# Patient Record
Sex: FEMALE | Race: OTHER | Employment: PART TIME | URBAN - METROPOLITAN AREA
[De-identification: names, ages, dates, MRNs, and addresses within clinical notes are randomized per-mention and may not be internally consistent; named-entity substitution may affect disease eponyms.]

---

## 2017-02-07 ENCOUNTER — OFFICE VISIT (OUTPATIENT)
Dept: OBGYN CLINIC | Facility: CLINIC | Age: 37
End: 2017-02-07

## 2017-02-07 VITALS
DIASTOLIC BLOOD PRESSURE: 64 MMHG | HEART RATE: 66 BPM | SYSTOLIC BLOOD PRESSURE: 99 MMHG | WEIGHT: 194 LBS | BODY MASS INDEX: 31 KG/M2

## 2017-02-07 DIAGNOSIS — N92.6 MISSED MENSES: Primary | ICD-10-CM

## 2017-02-07 LAB
CONTROL LINE PRESENT WITH A CLEAR BACKGROUND (YES/NO): YES YES/NO
KIT LOT #: NORMAL NUMERIC
PREGNANCY TEST, URINE: POSITIVE

## 2017-02-07 PROCEDURE — 99213 OFFICE O/P EST LOW 20 MIN: CPT | Performed by: ADVANCED PRACTICE MIDWIFE

## 2017-02-07 PROCEDURE — 81025 URINE PREGNANCY TEST: CPT | Performed by: ADVANCED PRACTICE MIDWIFE

## 2017-02-07 RX ORDER — DIPHENHYDRAMINE HCL/ZINC ACET 2 %-0.1 %
AEROSOL, SPRAY (GRAM) TOPICAL
COMMUNITY

## 2017-02-07 NOTE — PROGRESS NOTES
S: Here for missed menses. Previous patient. Denies pain or bleeding. Occasional queasy feeling in afternoon. Taking PNV.    O: Urine HCG +   A: Presumptive IUP @ 6 3/7  wks  AMA  P:   1) Cont PNV  2) Reviewed pregnancy recommendations regarding weight gain

## 2017-02-23 ENCOUNTER — NURSE ONLY (OUTPATIENT)
Dept: OBGYN CLINIC | Facility: CLINIC | Age: 37
End: 2017-02-23

## 2017-02-23 VITALS — HEIGHT: 67 IN

## 2017-02-23 DIAGNOSIS — O26.851 SPOTTING AFFECTING PREGNANCY IN FIRST TRIMESTER: ICD-10-CM

## 2017-02-23 DIAGNOSIS — O09.521 AMA (ADVANCED MATERNAL AGE) MULTIGRAVIDA 35+, FIRST TRIMESTER: ICD-10-CM

## 2017-02-23 PROBLEM — O09.529 AMA (ADVANCED MATERNAL AGE) MULTIGRAVIDA 35+: Status: ACTIVE | Noted: 2017-02-23

## 2017-02-23 NOTE — PROGRESS NOTES
Phone Consult. NOB Education complete and information set aside for pt to . Pt is AMA, she has a prepregnancy BMI of 31.3, Pt has 2 Cats but does not change the litter box, and she is unsure if she ever had chicken pox or the vaccine.   1 HR GTT, H

## 2017-02-24 ENCOUNTER — HOSPITAL ENCOUNTER (OUTPATIENT)
Dept: ULTRASOUND IMAGING | Age: 37
Discharge: HOME | End: 2017-02-24
Attending: ADVANCED PRACTICE MIDWIFE
Payer: COMMERCIAL

## 2017-02-24 DIAGNOSIS — O26.851 SPOTTING AFFECTING PREGNANCY IN FIRST TRIMESTER: ICD-10-CM

## 2017-02-24 DIAGNOSIS — O09.521 AMA (ADVANCED MATERNAL AGE) MULTIGRAVIDA 35+, FIRST TRIMESTER: ICD-10-CM

## 2017-02-24 PROCEDURE — 76817 TRANSVAGINAL US OBSTETRIC: CPT

## 2017-02-24 PROCEDURE — 76801 OB US < 14 WKS SINGLE FETUS: CPT

## 2017-02-25 ENCOUNTER — TELEPHONE (OUTPATIENT)
Dept: OBGYN CLINIC | Facility: CLINIC | Age: 37
End: 2017-02-25

## 2017-02-25 DIAGNOSIS — O09.521 AMA (ADVANCED MATERNAL AGE) MULTIGRAVIDA 35+, FIRST TRIMESTER: Primary | ICD-10-CM

## 2017-02-25 DIAGNOSIS — O30.091 TWIN GESTATION, UNABLE TO DETERMINE NUMBER OF PLACENTA AND NUMBER OF AMNIOTIC SACS IN FIRST TRIMESTER: ICD-10-CM

## 2017-02-25 NOTE — TELEPHONE ENCOUNTER
----- Message from SHANNAN Manzo sent at 2/25/2017 12:35 PM CST -----  Please bring this to [facility] EMERGENCY MEDICAL [facility] attention! Someone should call patient ASAP to review next steps (prob MFM consult and ultrasound).

## 2017-02-27 ENCOUNTER — TELEPHONE (OUTPATIENT)
Dept: OBGYN CLINIC | Facility: CLINIC | Age: 37
End: 2017-02-27

## 2017-02-27 PROBLEM — O30.001 TWIN GESTATION IN FIRST TRIMESTER: Status: ACTIVE | Noted: 2017-02-27

## 2017-03-03 ENCOUNTER — INITIAL PRENATAL (OUTPATIENT)
Dept: OBGYN CLINIC | Facility: CLINIC | Age: 37
End: 2017-03-03

## 2017-03-03 VITALS
HEART RATE: 71 BPM | WEIGHT: 193 LBS | BODY MASS INDEX: 30 KG/M2 | SYSTOLIC BLOOD PRESSURE: 128 MMHG | DIASTOLIC BLOOD PRESSURE: 77 MMHG

## 2017-03-03 DIAGNOSIS — IMO0001 TWINS: ICD-10-CM

## 2017-03-03 DIAGNOSIS — N91.2 AMENORRHEA: Primary | ICD-10-CM

## 2017-03-03 DIAGNOSIS — O09.529 AMA (ADVANCED MATERNAL AGE) MULTIGRAVIDA 35+, UNSPECIFIED TRIMESTER: ICD-10-CM

## 2017-03-03 LAB
APPEARANCE: CLEAR
MULTISTIX LOT#: NORMAL NUMERIC
PH, URINE: 5 (ref 4.5–8)
SPECIFIC GRAVITY: 1.01 (ref 1–1.03)
URINE-COLOR: YELLOW

## 2017-03-03 PROCEDURE — 99244 OFF/OP CNSLTJ NEW/EST MOD 40: CPT | Performed by: OBSTETRICS & GYNECOLOGY

## 2017-03-03 PROCEDURE — 81002 URINALYSIS NONAUTO W/O SCOPE: CPT | Performed by: OBSTETRICS & GYNECOLOGY

## 2017-03-05 NOTE — PROGRESS NOTES
HPI:   Oxana Minor is a 43year old female who presents for a first visit/consult visit for twin pregnancy, was referred by midwife group. Pt in exam room/seen and counseled for approx 25 min.   Pt counseled extesnively on twin pregnancy/care of twin Smokeless Status: Never Used                        Alcohol Use: Yes           4.2 oz/week       7 Glasses of wine per week           REVIEW OF SYSTEMS:   GENERAL: feels well otherwise  SKIN: denies any unusual skin lesions  EYES:denies blurred vision or d

## 2017-03-10 ENCOUNTER — OFFICE VISIT (OUTPATIENT)
Dept: OBGYN CLINIC | Facility: CLINIC | Age: 37
End: 2017-03-10

## 2017-03-10 VITALS — DIASTOLIC BLOOD PRESSURE: 60 MMHG | SYSTOLIC BLOOD PRESSURE: 104 MMHG

## 2017-03-10 DIAGNOSIS — N91.1 SECONDARY AMENORRHEA: Primary | ICD-10-CM

## 2017-03-10 DIAGNOSIS — IMO0001 TWINS: ICD-10-CM

## 2017-03-10 PROCEDURE — 99213 OFFICE O/P EST LOW 20 MIN: CPT | Performed by: OBSTETRICS & GYNECOLOGY

## 2017-03-10 PROCEDURE — 76856 US EXAM PELVIC COMPLETE: CPT | Performed by: OBSTETRICS & GYNECOLOGY

## 2017-03-10 NOTE — PROGRESS NOTES
HPI:   Konstantin William is a 43year old female who presents for a hx of twin pregnancy/ ama. There is no weight on file to calculate BMI.      No results found for: CHOLEST, HDL, LDL, TRIGLY, AST, ALT       Current Outpatient Prescriptions:  Lactobacil headaches  PSYCHE: denies depression or anxiety  HEMATOLOGIC: denies hx of anemia  ENDOCRINE: denies thyroid history  ALL/ASTHMA: denies hx of allergy or asthma    EXAM:   /60 mmHg  LMP 12/24/2016 (Exact Date)  There is no weight on file to calculate

## 2017-03-21 ENCOUNTER — HOSPITAL ENCOUNTER (OUTPATIENT)
Dept: PERINATAL CARE | Facility: HOSPITAL | Age: 37
Discharge: HOME | End: 2017-03-21
Attending: OBSTETRICS & GYNECOLOGY
Payer: COMMERCIAL

## 2017-03-21 VITALS — HEART RATE: 72 BPM | DIASTOLIC BLOOD PRESSURE: 58 MMHG | SYSTOLIC BLOOD PRESSURE: 104 MMHG

## 2017-03-21 DIAGNOSIS — O30.009 FIRST TRIMESTER SCREENING FOR TWIN PREGNANCY: Primary | ICD-10-CM

## 2017-03-21 DIAGNOSIS — O09.521 AMA (ADVANCED MATERNAL AGE) MULTIGRAVIDA 35+, FIRST TRIMESTER: ICD-10-CM

## 2017-03-21 DIAGNOSIS — O30.009 FIRST TRIMESTER SCREENING FOR TWIN PREGNANCY: ICD-10-CM

## 2017-03-21 DIAGNOSIS — Z36.89 FIRST TRIMESTER SCREENING FOR TWIN PREGNANCY: ICD-10-CM

## 2017-03-21 DIAGNOSIS — Z36.89 FIRST TRIMESTER SCREENING FOR TWIN PREGNANCY: Primary | ICD-10-CM

## 2017-03-21 DIAGNOSIS — O30.039 MONOCHORIONIC DIAMNIOTIC TWIN PREGNANCY: ICD-10-CM

## 2017-03-21 PROCEDURE — 76813 OB US NUCHAL MEAS 1 GEST: CPT

## 2017-03-21 PROCEDURE — 76801 OB US < 14 WKS SINGLE FETUS: CPT | Performed by: OBSTETRICS & GYNECOLOGY

## 2017-03-21 PROCEDURE — 76802 OB US < 14 WKS ADDL FETUS: CPT | Performed by: OBSTETRICS & GYNECOLOGY

## 2017-03-21 PROCEDURE — 99244 OFF/OP CNSLTJ NEW/EST MOD 40: CPT | Performed by: OBSTETRICS & GYNECOLOGY

## 2017-03-21 PROCEDURE — 76814 OB US NUCHAL MEAS ADD-ON: CPT | Performed by: OBSTETRICS & GYNECOLOGY

## 2017-03-21 PROCEDURE — 76813 OB US NUCHAL MEAS 1 GEST: CPT | Performed by: OBSTETRICS & GYNECOLOGY

## 2017-03-21 NOTE — ADDENDUM NOTE
Encounter addended by: Christina Handley on: 3/21/2017  3:51 PM<BR>     Documentation filed: Charges VN

## 2017-03-21 NOTE — PROGRESS NOTES
Vijay Weathers is a 43year old female. Reason for Consult:   Twins. AMA. Natural conception. Doing well. No complaints.     Review of History:     OB History:    Obstetric History     T3    TAB1  SAB1  E0  M0  L3     Name of Baby 1: member 2008   • Chlamydia age 10   • Sexually transmitted disease    • Decorative tattoo           Past Surgical History    D & C        , , 2013    Comment 2013-1st degree perineal laceration.     OTHER SURGICAL HISTORY  201 births), and twins account for 12 to 15 percent of  deaths.          The mortality rate of infants is higher in multiple than valadez pregnancies because of the increased rates of prematurity, growth abnormalities, other obstetric complications, a of parents of multiples may provide helpful coping strategies. Advanced maternal age typically refers to a pregnant woman who will be 28years of age or older on the estimated date of confinement.  The increased occurrence of births at older mate ultrasound(level 2) is recommended even if the fetus has a normal karyotype.                Women 28years of age or older can expect to experience two to three fold higher rates of hospitalization,  delivery, and pregnancy-related complications as estimated risk based on maternal age at amniocentesis with any chromosome abnormality is about 1:45  and with Down Syndrome is about 1:61. We also discussed the risks and benefits of having  genetic testing ([facility] and amniocentesis) performed.         FIRST spent in face to face discussion with the patient.

## 2017-03-22 NOTE — ADDENDUM NOTE
Encounter addended by: Cynthia Roe RN on: 3/22/2017  9:29 AM<BR>     Documentation filed: Charges VN

## 2017-03-27 ENCOUNTER — TELEPHONE (OUTPATIENT)
Dept: OBGYN CLINIC | Facility: CLINIC | Age: 37
End: 2017-03-27

## 2017-03-27 ENCOUNTER — ROUTINE PRENATAL (OUTPATIENT)
Dept: OBGYN CLINIC | Facility: CLINIC | Age: 37
End: 2017-03-27

## 2017-03-27 VITALS
WEIGHT: 199.38 LBS | DIASTOLIC BLOOD PRESSURE: 65 MMHG | SYSTOLIC BLOOD PRESSURE: 99 MMHG | HEART RATE: 70 BPM | BODY MASS INDEX: 31 KG/M2

## 2017-03-27 DIAGNOSIS — O09.529 AMA (ADVANCED MATERNAL AGE) MULTIGRAVIDA 35+, UNSPECIFIED TRIMESTER: Primary | ICD-10-CM

## 2017-03-27 DIAGNOSIS — Z34.81 ENCOUNTER FOR SUPERVISION OF OTHER NORMAL PREGNANCY IN FIRST TRIMESTER: ICD-10-CM

## 2017-03-27 DIAGNOSIS — O30.001 TWIN GESTATION IN FIRST TRIMESTER, UNSPECIFIED MULTIPLE GESTATION TYPE: ICD-10-CM

## 2017-03-27 LAB
APPEARANCE: CLEAR
MULTISTIX LOT#: NORMAL NUMERIC
PH, URINE: 5 (ref 4.5–8)
SPECIFIC GRAVITY: 1.01 (ref 1–1.03)
URINE-COLOR: YELLOW
UROBILINOGEN,SEMI-QN: 0.2 MG/DL (ref 0–1.9)

## 2017-03-27 NOTE — PROGRESS NOTES
Pt complains of abdominal cramping rating 2-3/10 and lower back pain rating 4-5/10 x1 day, no bleeding or spotting

## 2017-03-27 NOTE — PROGRESS NOTES
[facility], [facility]  Obstetrics and Gynecology  Prenatal Visit  Nguyễn Davis MD    LON Turner is a 43year old.o. [id number] 13w2d weeks. Patient called today with complaints of back discomfort and has concerns about miscarriage risk.   Patient de of pregnancy loss from miscarriage at this point is low. Patient is traveling to [place] in 1 week and we reviewed risks of travel in pregnancy. I counseled patient that I have no reservations in her traveling at this time.   Patient follow-up at her prev

## 2017-03-27 NOTE — TELEPHONE ENCOUNTER
They are travelling to [place] on April 21, she wants to make sure it's ok to fly, she'll be about 17 weeks, please advise so she can buy plane tickets

## 2017-03-27 NOTE — TELEPHONE ENCOUNTER
Per CARMINE patient needs to seen in the office today, made an jessica with JF today at 3pm, no further questions at this time

## 2017-03-27 NOTE — TELEPHONE ENCOUNTER
Patient with twin pregnancy ga 13 having consistent abd cramping 3/10 and lower back ache 5/10, patient recently had an injury to her back and PT couple weeks ago, denies any dysuria or frquency, no spotting or bleeding, patient is drinking fluids, please

## 2017-03-29 ENCOUNTER — TELEPHONE (OUTPATIENT)
Dept: OBGYN CLINIC | Facility: CLINIC | Age: 37
End: 2017-03-29

## 2017-03-29 NOTE — TELEPHONE ENCOUNTER
Per Dr. Destiny Hawkins, pt to stay hydrated chicken soup, broth, gaterade drink fluids, brat diet. Patient may take immodium AD as needed, pt to call if not better 48hrs. Patient informed of Dr. Destiny Hawkins verbal advise and agreed. Patient verbalized understanding.

## 2017-03-29 NOTE — TELEPHONE ENCOUNTER
Patient c/o diarrhea, no body chill or body aches, no fever, having nausea no vomiting. Per pt Temp therm is broken reading 94.07. Diarrhea last night x4 BM explosive watery diarrhea, no blood in stool. Today x2 since morning very loose.  Per pt no other pe

## 2017-04-03 ENCOUNTER — TELEPHONE (OUTPATIENT)
Dept: OBGYN CLINIC | Facility: CLINIC | Age: 37
End: 2017-04-03

## 2017-04-03 NOTE — TELEPHONE ENCOUNTER
Per Dr. Christian Dies to try bananas, bulking agents, try Immodium again and if no improvement or dehydrated to go to Immediate care or ER. Pt advised of recs and states understanding. Pt states has been eating bananas w/o improvement.   States is concern about

## 2017-04-03 NOTE — TELEPHONE ENCOUNTER
14w2d c/o experiencing diarrhea for 1 week.  one day in that week ate something and the next day diarrhea started again.  had a couple of boiled eggs this morning and so far has had 4 episodes of diarrhea.   symptoms started after she con

## 2017-04-03 NOTE — TELEPHONE ENCOUNTER
14 weeks pregnant having diarrhea for a week, pt does not want a nurse call back and wants to spk w someone now

## 2017-04-07 ENCOUNTER — INITIAL PRENATAL (OUTPATIENT)
Dept: OBGYN CLINIC | Facility: CLINIC | Age: 37
End: 2017-04-07

## 2017-04-07 VITALS — DIASTOLIC BLOOD PRESSURE: 63 MMHG | WEIGHT: 194 LBS | SYSTOLIC BLOOD PRESSURE: 96 MMHG | BODY MASS INDEX: 30 KG/M2

## 2017-04-07 DIAGNOSIS — Z34.82 OTHER NORMAL PREGNANCY, NOT FIRST, SECOND TRIMESTER: Primary | ICD-10-CM

## 2017-04-07 PROCEDURE — 81002 URINALYSIS NONAUTO W/O SCOPE: CPT | Performed by: OBSTETRICS & GYNECOLOGY

## 2017-04-12 ENCOUNTER — HOSPITAL ENCOUNTER (OUTPATIENT)
Dept: PERINATAL CARE | Facility: HOSPITAL | Age: 37
Discharge: HOME | End: 2017-04-12
Attending: OBSTETRICS & GYNECOLOGY
Payer: COMMERCIAL

## 2017-04-12 VITALS — SYSTOLIC BLOOD PRESSURE: 104 MMHG | DIASTOLIC BLOOD PRESSURE: 63 MMHG | HEART RATE: 96 BPM

## 2017-04-12 DIAGNOSIS — O30.039 MONOCHORIONIC DIAMNIOTIC TWIN PREGNANCY: ICD-10-CM

## 2017-04-12 DIAGNOSIS — O09.522 AMA (ADVANCED MATERNAL AGE) MULTIGRAVIDA 35+, SECOND TRIMESTER: ICD-10-CM

## 2017-04-12 DIAGNOSIS — O09.522 AMA (ADVANCED MATERNAL AGE) MULTIGRAVIDA 35+, SECOND TRIMESTER: Primary | ICD-10-CM

## 2017-04-12 PROCEDURE — 76805 OB US >/= 14 WKS SNGL FETUS: CPT | Performed by: OBSTETRICS & GYNECOLOGY

## 2017-04-12 PROCEDURE — 76805 OB US >/= 14 WKS SNGL FETUS: CPT

## 2017-04-12 PROCEDURE — 76820 UMBILICAL ARTERY ECHO: CPT | Performed by: OBSTETRICS & GYNECOLOGY

## 2017-04-12 PROCEDURE — 76810 OB US >/= 14 WKS ADDL FETUS: CPT | Performed by: OBSTETRICS & GYNECOLOGY

## 2017-04-12 PROCEDURE — 76817 TRANSVAGINAL US OBSTETRIC: CPT | Performed by: OBSTETRICS & GYNECOLOGY

## 2017-04-12 PROCEDURE — 99213 OFFICE O/P EST LOW 20 MIN: CPT | Performed by: OBSTETRICS & GYNECOLOGY

## 2017-04-12 NOTE — PROGRESS NOTES
Gaudencio Lopez is a 43year old female. Reason for Consult:   Twins. AMA. Natural conception. Doing well. No complaints.         OB History:            NARRATIVE:     /63 mmHg  Pulse 96  LMP 2016 (Exact Date)          Alert a

## 2017-04-12 NOTE — ADDENDUM NOTE
Encounter addended by:  Renata Bates MD on: 4/12/2017  4:31 PM<BR>     Documentation filed: Charges VN

## 2017-04-17 ENCOUNTER — HOSPITAL ENCOUNTER (OUTPATIENT)
Dept: PERINATAL CARE | Facility: HOSPITAL | Age: 37
Discharge: HOME | End: 2017-04-17
Attending: OBSTETRICS & GYNECOLOGY
Payer: COMMERCIAL

## 2017-04-17 ENCOUNTER — ROUTINE PRENATAL (OUTPATIENT)
Dept: OBGYN CLINIC | Facility: CLINIC | Age: 37
End: 2017-04-17

## 2017-04-17 ENCOUNTER — TELEPHONE (OUTPATIENT)
Dept: OBGYN CLINIC | Facility: CLINIC | Age: 37
End: 2017-04-17

## 2017-04-17 VITALS — DIASTOLIC BLOOD PRESSURE: 69 MMHG | SYSTOLIC BLOOD PRESSURE: 103 MMHG

## 2017-04-17 VITALS — HEART RATE: 80 BPM | SYSTOLIC BLOOD PRESSURE: 100 MMHG | DIASTOLIC BLOOD PRESSURE: 55 MMHG

## 2017-04-17 DIAGNOSIS — R10.2 PELVIC PRESSURE IN PREGNANCY, ANTEPARTUM, SECOND TRIMESTER: Primary | ICD-10-CM

## 2017-04-17 DIAGNOSIS — O26.892 PELVIC PRESSURE IN PREGNANCY, ANTEPARTUM, SECOND TRIMESTER: Primary | ICD-10-CM

## 2017-04-17 DIAGNOSIS — R10.2 PELVIC PRESSURE IN FEMALE: Primary | ICD-10-CM

## 2017-04-17 DIAGNOSIS — O47.00 PRETERM CONTRACTIONS: ICD-10-CM

## 2017-04-17 DIAGNOSIS — R10.2 PELVIC PRESSURE IN PREGNANCY, ANTEPARTUM, SECOND TRIMESTER: ICD-10-CM

## 2017-04-17 DIAGNOSIS — O26.892 PELVIC PRESSURE IN PREGNANCY, ANTEPARTUM, SECOND TRIMESTER: ICD-10-CM

## 2017-04-17 DIAGNOSIS — O30.039 MONOCHORIONIC DIAMNIOTIC TWIN PREGNANCY: ICD-10-CM

## 2017-04-17 PROCEDURE — 76817 TRANSVAGINAL US OBSTETRIC: CPT

## 2017-04-17 PROCEDURE — 76815 OB US LIMITED FETUS(S): CPT | Performed by: OBSTETRICS & GYNECOLOGY

## 2017-04-17 PROCEDURE — 99213 OFFICE O/P EST LOW 20 MIN: CPT | Performed by: OBSTETRICS & GYNECOLOGY

## 2017-04-17 PROCEDURE — 76817 TRANSVAGINAL US OBSTETRIC: CPT | Performed by: OBSTETRICS & GYNECOLOGY

## 2017-04-17 PROCEDURE — 81002 URINALYSIS NONAUTO W/O SCOPE: CPT | Performed by: OBSTETRICS & GYNECOLOGY

## 2017-04-17 NOTE — PROGRESS NOTES
Pt c/o lower pelvic pressure x3days, no vag bleeding, no leakage or fluid, no abnormal vag discharge

## 2017-04-17 NOTE — PROGRESS NOTES
Problem visit: pt presented to office stating she felt marcin snowden several times a day for the last 3 days and some pelvic pressure. No bleeding or abnl discharge. Alert and oriented,nad  Chest cta  Heart rrr  abd soft, nt, gravid, fh 25 cm.  No uterine

## 2017-04-17 NOTE — PROGRESS NOTES
Pt from md office   Gregg Pleitez ctx x 3 days  Pelvic pressure  Pt going out of town Friday  States was frightened at office md could not find fht initally

## 2017-04-17 NOTE — TELEPHONE ENCOUNTER
Pt voices she has been having consist ant marcin snowden for the past 3 days. Pt voices positive fetal movement, but fetal movement \"seems to be not as often as usual\". Pt denies vaginal bleeding or leaking. Pushing fluids.  MLM called and made aware of pt

## 2017-04-17 NOTE — PROGRESS NOTES
Lowell Tate is a 43year old female. Reason for Consult:   Twins. AMA. Natural conception. Doing well. Complains pressure and cramping. Planning to go to [place] next week to look for a house. Moving.         OB History:          NA

## 2017-04-27 ENCOUNTER — LAB ENCOUNTER (OUTPATIENT)
Dept: LAB | Facility: HOSPITAL | Age: 37
End: 2017-04-27
Attending: ADVANCED PRACTICE MIDWIFE
Payer: COMMERCIAL

## 2017-04-27 ENCOUNTER — TELEPHONE (OUTPATIENT)
Dept: OBGYN CLINIC | Facility: CLINIC | Age: 37
End: 2017-04-27

## 2017-04-27 DIAGNOSIS — O09.521 AMA (ADVANCED MATERNAL AGE) MULTIGRAVIDA 35+, FIRST TRIMESTER: ICD-10-CM

## 2017-04-27 PROCEDURE — 85025 COMPLETE CBC W/AUTO DIFF WBC: CPT

## 2017-04-27 PROCEDURE — 82950 GLUCOSE TEST: CPT

## 2017-04-27 PROCEDURE — 86780 TREPONEMA PALLIDUM: CPT

## 2017-04-27 PROCEDURE — 87086 URINE CULTURE/COLONY COUNT: CPT

## 2017-04-27 PROCEDURE — 86777 TOXOPLASMA ANTIBODY: CPT

## 2017-04-27 PROCEDURE — 87389 HIV-1 AG W/HIV-1&-2 AB AG IA: CPT

## 2017-04-27 PROCEDURE — 83036 HEMOGLOBIN GLYCOSYLATED A1C: CPT

## 2017-04-27 PROCEDURE — 86778 TOXOPLASMA ANTIBODY IGM: CPT

## 2017-04-27 PROCEDURE — 85660 RBC SICKLE CELL TEST: CPT

## 2017-04-27 PROCEDURE — 86787 VARICELLA-ZOSTER ANTIBODY: CPT

## 2017-04-27 PROCEDURE — 82306 VITAMIN D 25 HYDROXY: CPT

## 2017-04-27 PROCEDURE — 84443 ASSAY THYROID STIM HORMONE: CPT

## 2017-04-27 PROCEDURE — 86900 BLOOD TYPING SEROLOGIC ABO: CPT | Performed by: ADVANCED PRACTICE MIDWIFE

## 2017-04-27 PROCEDURE — 87340 HEPATITIS B SURFACE AG IA: CPT

## 2017-04-27 PROCEDURE — 86850 RBC ANTIBODY SCREEN: CPT

## 2017-04-27 PROCEDURE — 86762 RUBELLA ANTIBODY: CPT

## 2017-04-27 PROCEDURE — 86901 BLOOD TYPING SEROLOGIC RH(D): CPT | Performed by: ADVANCED PRACTICE MIDWIFE

## 2017-04-27 PROCEDURE — 86803 HEPATITIS C AB TEST: CPT

## 2017-04-27 PROCEDURE — 36415 COLL VENOUS BLD VENIPUNCTURE: CPT

## 2017-04-27 NOTE — TELEPHONE ENCOUNTER
Advised pt that she still has all of her initial OB labs outstanding & need to be completed ASAP. Pt now a pt of [facility] for twin pregnancy. Pt verbalized an understanding & agrees w/ plan. States she will go later today.

## 2017-04-28 ENCOUNTER — HOSPITAL ENCOUNTER (OUTPATIENT)
Dept: PERINATAL CARE | Facility: HOSPITAL | Age: 37
Discharge: HOME | End: 2017-04-28
Attending: OBSTETRICS & GYNECOLOGY
Payer: COMMERCIAL

## 2017-04-28 VITALS — SYSTOLIC BLOOD PRESSURE: 97 MMHG | DIASTOLIC BLOOD PRESSURE: 61 MMHG | HEART RATE: 80 BPM

## 2017-04-28 DIAGNOSIS — O09.522 AMA (ADVANCED MATERNAL AGE) MULTIGRAVIDA 35+, SECOND TRIMESTER: ICD-10-CM

## 2017-04-28 DIAGNOSIS — O30.039 MONOCHORIONIC DIAMNIOTIC TWIN PREGNANCY: Primary | ICD-10-CM

## 2017-04-28 DIAGNOSIS — O30.039 MONOCHORIONIC DIAMNIOTIC TWIN PREGNANCY: ICD-10-CM

## 2017-04-28 PROCEDURE — 76815 OB US LIMITED FETUS(S): CPT

## 2017-04-28 PROCEDURE — 76815 OB US LIMITED FETUS(S): CPT | Performed by: OBSTETRICS & GYNECOLOGY

## 2017-04-28 PROCEDURE — 99213 OFFICE O/P EST LOW 20 MIN: CPT | Performed by: OBSTETRICS & GYNECOLOGY

## 2017-04-28 PROCEDURE — 76820 UMBILICAL ARTERY ECHO: CPT | Performed by: OBSTETRICS & GYNECOLOGY

## 2017-04-28 NOTE — PROGRESS NOTES
Tanisha Meyer is a 43year old female.       Reason for Consult:    Twins.  AMA.    Natural conception. Doing well.  No complaints.       OB History:        S:  She reports good FM, no contractions or bleeding      NARRATIVE:      BP 97/61 mmHg discordance in estimated twin weights greater than 25 percent (discordance = weight larger twin – weight smaller twin/weight larger twin). Selective growth restriction has been classified into three types.   · Single fetal demise of one twin of a monochorio to four weeks when ultrasound is performed to monitor for TTTS and TAPS. · Monochorionic placentation is a significant risk factor for discordant growth due to unequal sharing of the placenta or TTTS.  Monochorionic placentation also appears to have a smal

## 2017-04-29 ENCOUNTER — TELEPHONE (OUTPATIENT)
Dept: OBGYN CLINIC | Facility: CLINIC | Age: 37
End: 2017-04-29

## 2017-04-29 NOTE — TELEPHONE ENCOUNTER
LMTCB. All labs normal except rubella. Need to inform pt that rubella will be re checked in hospital to see if she needs vaccine or not.

## 2017-04-29 NOTE — TELEPHONE ENCOUNTER
Informed pt that all her labs are normal except Rubella. Informed that her rubella level will be checked again in the hospital to see if she needs that vaccine or not. Pt states that she is seeing Dr. Conchis Coyne now d/t her being pregnant with twins.  Informe

## 2017-04-29 NOTE — TELEPHONE ENCOUNTER
----- Message from SHANNAN Renteria sent at 4/28/2017  5:17 PM CDT -----  Please review all the results.    WE will re-check rubella in hospital to see if she needs vaccine or not

## 2017-05-01 NOTE — ADDENDUM NOTE
Encounter addended by: Brigida Machado on: 5/1/2017  9:12 AM<BR>     Documentation filed: Charges VN

## 2017-05-04 ENCOUNTER — ROUTINE PRENATAL (OUTPATIENT)
Dept: OBGYN CLINIC | Facility: CLINIC | Age: 37
End: 2017-05-04

## 2017-05-04 VITALS — SYSTOLIC BLOOD PRESSURE: 100 MMHG | BODY MASS INDEX: 32 KG/M2 | WEIGHT: 201.19 LBS | DIASTOLIC BLOOD PRESSURE: 66 MMHG

## 2017-05-04 DIAGNOSIS — Z34.82 OTHER NORMAL PREGNANCY, NOT FIRST, SECOND TRIMESTER: Primary | ICD-10-CM

## 2017-05-04 NOTE — PROGRESS NOTES
No c/o. Good fm. Monochor/Diamn girl/girl  Has Level 2 u/s 20 wk appt.   Will have q 2 week TTS u/s with MFM

## 2017-05-12 ENCOUNTER — HOSPITAL ENCOUNTER (OUTPATIENT)
Dept: PERINATAL CARE | Facility: HOSPITAL | Age: 37
Discharge: HOME | End: 2017-05-12
Attending: OBSTETRICS & GYNECOLOGY
Payer: COMMERCIAL

## 2017-05-12 VITALS — HEART RATE: 90 BPM | DIASTOLIC BLOOD PRESSURE: 62 MMHG | SYSTOLIC BLOOD PRESSURE: 108 MMHG

## 2017-05-12 DIAGNOSIS — O30.032 MONOCHORIONIC DIAMNIOTIC TWIN GESTATION IN SECOND TRIMESTER: ICD-10-CM

## 2017-05-12 DIAGNOSIS — O09.522 AMA (ADVANCED MATERNAL AGE) MULTIGRAVIDA 35+, SECOND TRIMESTER: ICD-10-CM

## 2017-05-12 DIAGNOSIS — O30.032 MONOCHORIONIC DIAMNIOTIC TWIN GESTATION IN SECOND TRIMESTER: Primary | ICD-10-CM

## 2017-05-12 PROCEDURE — 76820 UMBILICAL ARTERY ECHO: CPT | Performed by: OBSTETRICS & GYNECOLOGY

## 2017-05-12 PROCEDURE — 76811 OB US DETAILED SNGL FETUS: CPT | Performed by: OBSTETRICS & GYNECOLOGY

## 2017-05-12 PROCEDURE — 76812 OB US DETAILED ADDL FETUS: CPT | Performed by: OBSTETRICS & GYNECOLOGY

## 2017-05-12 PROCEDURE — 99214 OFFICE O/P EST MOD 30 MIN: CPT | Performed by: OBSTETRICS & GYNECOLOGY

## 2017-05-12 NOTE — PROGRESS NOTES
Meryl Wang is a 43year old female.        Reason for Consult:     Twins.  AMA.    Natural conception. OB History:         S:  She reports good FM, no contractions or bleeding.  Doing well.  No complaints.      NARRATIVE:       /62 mm in placental territory) – Selective growth restriction is variously defined as estimated weight of one twin below the 10th percentile or discordance in estimated twin weights greater than 25 percent (discordance = weight larger twin – weight smaller twin/w concerns.      Total patient time was 25 minutes in evaluation, consultation, and coordination of care.  Greater than 50% of this time was spent in face to face discussion with the patient.

## 2017-05-12 NOTE — PROGRESS NOTES
Pt for level 2 U/S  Hx Monochorionic diamniotic twin pregnancy  Denies pregnancy complaints  States feels fetal mvmt

## 2017-05-22 ENCOUNTER — HOSPITAL ENCOUNTER (OUTPATIENT)
Dept: PERINATAL CARE | Facility: HOSPITAL | Age: 37
Discharge: HOME | End: 2017-05-22
Attending: OBSTETRICS & GYNECOLOGY
Payer: COMMERCIAL

## 2017-05-22 VITALS
WEIGHT: 207 LBS | BODY MASS INDEX: 32.49 KG/M2 | RESPIRATION RATE: 16 BRPM | DIASTOLIC BLOOD PRESSURE: 59 MMHG | HEIGHT: 67 IN | SYSTOLIC BLOOD PRESSURE: 105 MMHG | HEART RATE: 76 BPM

## 2017-05-22 DIAGNOSIS — O30.039 MONOCHORIONIC DIAMNIOTIC TWIN PREGNANCY: ICD-10-CM

## 2017-05-22 DIAGNOSIS — O30.039 MONOCHORIONIC DIAMNIOTIC TWIN PREGNANCY: Primary | ICD-10-CM

## 2017-05-22 DIAGNOSIS — O09.522 AMA (ADVANCED MATERNAL AGE) MULTIGRAVIDA 35+, SECOND TRIMESTER: ICD-10-CM

## 2017-05-22 PROCEDURE — 76815 OB US LIMITED FETUS(S): CPT | Performed by: OBSTETRICS & GYNECOLOGY

## 2017-05-22 PROCEDURE — 76820 UMBILICAL ARTERY ECHO: CPT | Performed by: OBSTETRICS & GYNECOLOGY

## 2017-05-22 PROCEDURE — 99214 OFFICE O/P EST MOD 30 MIN: CPT | Performed by: OBSTETRICS & GYNECOLOGY

## 2017-05-22 NOTE — PROGRESS NOTES
Gustav Phoenix is a 43year old female.        Reason for Consult:      Twins.  AMA.    Natural conception. OB History:         S:  She reports good FM, no contractions or bleeding.  Doing well.  No complaints.      NARRATIVE:        /59 unequal placental sharing (discordance in placental territory) – Selective growth restriction is variously defined as estimated weight of one twin below the 10th percentile or discordance in estimated twin weights greater than 25 percent (discordance = harlan patient time was 25 minutes in evaluation, consultation, and coordination of care.  Greater than 50% of this time was spent in face to face discussion with the patient.

## 2017-05-30 ENCOUNTER — ROUTINE PRENATAL (OUTPATIENT)
Dept: OBGYN CLINIC | Facility: CLINIC | Age: 37
End: 2017-05-30

## 2017-05-30 VITALS
HEART RATE: 75 BPM | BODY MASS INDEX: 32 KG/M2 | WEIGHT: 203 LBS | DIASTOLIC BLOOD PRESSURE: 65 MMHG | SYSTOLIC BLOOD PRESSURE: 99 MMHG

## 2017-05-30 DIAGNOSIS — Z34.82 OTHER NORMAL PREGNANCY, NOT FIRST, SECOND TRIMESTER: Primary | ICD-10-CM

## 2017-05-30 PROCEDURE — 81002 URINALYSIS NONAUTO W/O SCOPE: CPT | Performed by: OBSTETRICS & GYNECOLOGY

## 2017-05-30 PROCEDURE — 99213 OFFICE O/P EST LOW 20 MIN: CPT | Performed by: OBSTETRICS & GYNECOLOGY

## 2017-06-05 ENCOUNTER — HOSPITAL ENCOUNTER (OUTPATIENT)
Dept: PERINATAL CARE | Facility: HOSPITAL | Age: 37
Discharge: HOME | End: 2017-06-05
Attending: OBSTETRICS & GYNECOLOGY
Payer: COMMERCIAL

## 2017-06-05 VITALS — SYSTOLIC BLOOD PRESSURE: 99 MMHG | DIASTOLIC BLOOD PRESSURE: 53 MMHG | HEART RATE: 68 BPM | RESPIRATION RATE: 18 BRPM

## 2017-06-05 DIAGNOSIS — O09.522 AMA (ADVANCED MATERNAL AGE) MULTIGRAVIDA 35+, SECOND TRIMESTER: ICD-10-CM

## 2017-06-05 DIAGNOSIS — O30.039 MONOCHORIONIC DIAMNIOTIC TWIN PREGNANCY: ICD-10-CM

## 2017-06-05 DIAGNOSIS — O09.522 AMA (ADVANCED MATERNAL AGE) MULTIGRAVIDA 35+, SECOND TRIMESTER: Primary | ICD-10-CM

## 2017-06-05 PROCEDURE — 76805 OB US >/= 14 WKS SNGL FETUS: CPT | Performed by: OBSTETRICS & GYNECOLOGY

## 2017-06-05 PROCEDURE — 76810 OB US >/= 14 WKS ADDL FETUS: CPT | Performed by: OBSTETRICS & GYNECOLOGY

## 2017-06-05 PROCEDURE — 99244 OFF/OP CNSLTJ NEW/EST MOD 40: CPT | Performed by: OBSTETRICS & GYNECOLOGY

## 2017-06-05 PROCEDURE — 76820 UMBILICAL ARTERY ECHO: CPT | Performed by: OBSTETRICS & GYNECOLOGY

## 2017-06-05 NOTE — PROGRESS NOTES
Marvel Rodriguez is a 43year old female.        Reason for Consult:       Twins.  AMA.    Natural conception. OB History:         S:  She reports good FM, no contractions or bleeding.  Doing well.  No complaints.      NARRATIVE:         BP 99/53 placental sharing (discordance in placental territory) – Selective growth restriction is variously defined as estimated weight of one twin below the 10th percentile or discordance in estimated twin weights greater than 25 percent (discordance = weight larg      Total patient time was 25 minutes in evaluation, consultation, and coordination of care.  Greater than 50% of this time was spent in face to face discussion with the patient.

## 2017-06-14 ENCOUNTER — TELEPHONE (OUTPATIENT)
Dept: OBGYN CLINIC | Facility: CLINIC | Age: 37
End: 2017-06-14

## 2017-06-14 NOTE — TELEPHONE ENCOUNTER
Pt asking how much did she weight at her missed menses appt. Informed pt that on 02/07/17 she weighed 194lbs. Pt states that she wants to keep up with her weight. Pt verbalized understanding and agrees with plan.

## 2017-06-15 ENCOUNTER — HOSPITAL ENCOUNTER (OUTPATIENT)
Dept: PERINATAL CARE | Facility: HOSPITAL | Age: 37
Discharge: HOME | End: 2017-06-15
Attending: OBSTETRICS & GYNECOLOGY
Payer: COMMERCIAL

## 2017-06-15 VITALS — SYSTOLIC BLOOD PRESSURE: 100 MMHG | HEART RATE: 82 BPM | DIASTOLIC BLOOD PRESSURE: 57 MMHG

## 2017-06-15 DIAGNOSIS — O09.523 AMA (ADVANCED MATERNAL AGE) MULTIGRAVIDA 35+, THIRD TRIMESTER: ICD-10-CM

## 2017-06-15 DIAGNOSIS — O30.039 MONOCHORIONIC DIAMNIOTIC TWIN PREGNANCY: ICD-10-CM

## 2017-06-15 DIAGNOSIS — O30.039 MONOCHORIONIC DIAMNIOTIC TWIN PREGNANCY: Primary | ICD-10-CM

## 2017-06-15 PROCEDURE — 76825 ECHO EXAM OF FETAL HEART: CPT | Performed by: OBSTETRICS & GYNECOLOGY

## 2017-06-15 PROCEDURE — 93325 DOPPLER ECHO COLOR FLOW MAPG: CPT | Performed by: OBSTETRICS & GYNECOLOGY

## 2017-06-15 PROCEDURE — 76820 UMBILICAL ARTERY ECHO: CPT | Performed by: OBSTETRICS & GYNECOLOGY

## 2017-06-15 PROCEDURE — 99214 OFFICE O/P EST MOD 30 MIN: CPT | Performed by: OBSTETRICS & GYNECOLOGY

## 2017-06-15 PROCEDURE — 76827 ECHO EXAM OF FETAL HEART: CPT | Performed by: OBSTETRICS & GYNECOLOGY

## 2017-06-15 NOTE — PROGRESS NOTES
Jonn Nguyen is a 43year old female.        Reason for Consult:        Mono/di Twins.  AMA.    Natural conception. OB History:     G6     S:  No problems or complaints. She reports good FM, no contractions or bleeding.  Doing well.     NARRATI sharing (discordance in placental territory) – Selective growth restriction is variously defined as estimated weight of one twin below the 10th percentile or discordance in estimated twin weights greater than 25 percent (discordance = weight larger twin – patient time was 25 minutes in evaluation, consultation, and coordination of care.  Greater than 50% of this time was spent in face to face discussion with the patient.

## 2017-06-22 ENCOUNTER — TELEPHONE (OUTPATIENT)
Dept: OBGYN CLINIC | Facility: CLINIC | Age: 37
End: 2017-06-22

## 2017-06-22 NOTE — TELEPHONE ENCOUNTER
Pt 25 weeks pregnant with twins reporting she felt more tired than usual this morning. C/o feeling off, light headed, heavy legs. Per pt was walking towards car and felt like she couldn't make it. Began blacking out, felt nauseas, clammy.    Lyed down

## 2017-06-28 ENCOUNTER — ROUTINE PRENATAL (OUTPATIENT)
Dept: OBGYN CLINIC | Facility: CLINIC | Age: 37
End: 2017-06-28

## 2017-06-28 VITALS
SYSTOLIC BLOOD PRESSURE: 97 MMHG | DIASTOLIC BLOOD PRESSURE: 67 MMHG | WEIGHT: 212 LBS | BODY MASS INDEX: 33 KG/M2 | HEART RATE: 85 BPM

## 2017-06-28 DIAGNOSIS — O09.529 SUPERVISION OF ELDERLY MULTIGRAVIDA, UNSPECIFIED TRIMESTER: ICD-10-CM

## 2017-06-28 DIAGNOSIS — O30.039 MONOCHORIONIC DIAMNIOTIC TWIN PREGNANCY, ANTEPARTUM: ICD-10-CM

## 2017-06-28 DIAGNOSIS — Z34.83 OTHER NORMAL PREGNANCY, NOT FIRST, THIRD TRIMESTER: Primary | ICD-10-CM

## 2017-06-28 PROBLEM — O99.891 RUBELLA NON-IMMUNE STATUS, ANTEPARTUM: Status: ACTIVE | Noted: 2017-06-28

## 2017-06-28 PROBLEM — Z28.3 RUBELLA NON-IMMUNE STATUS, ANTEPARTUM: Status: ACTIVE | Noted: 2017-06-28

## 2017-06-29 ENCOUNTER — HOSPITAL ENCOUNTER (OUTPATIENT)
Dept: PERINATAL CARE | Facility: HOSPITAL | Age: 37
Discharge: HOME | End: 2017-06-29
Attending: OBSTETRICS & GYNECOLOGY
Payer: COMMERCIAL

## 2017-06-29 VITALS
WEIGHT: 212 LBS | HEIGHT: 67 IN | DIASTOLIC BLOOD PRESSURE: 60 MMHG | SYSTOLIC BLOOD PRESSURE: 107 MMHG | BODY MASS INDEX: 33.27 KG/M2 | HEART RATE: 72 BPM | RESPIRATION RATE: 16 BRPM

## 2017-06-29 DIAGNOSIS — O30.039 MONOCHORIONIC DIAMNIOTIC TWIN PREGNANCY: Primary | ICD-10-CM

## 2017-06-29 DIAGNOSIS — O30.039 MONOCHORIONIC DIAMNIOTIC TWIN PREGNANCY: ICD-10-CM

## 2017-06-29 DIAGNOSIS — O09.523 AMA (ADVANCED MATERNAL AGE) MULTIGRAVIDA 35+, THIRD TRIMESTER: ICD-10-CM

## 2017-06-29 PROCEDURE — 76821 MIDDLE CEREBRAL ARTERY ECHO: CPT | Performed by: OBSTETRICS & GYNECOLOGY

## 2017-06-29 PROCEDURE — 76820 UMBILICAL ARTERY ECHO: CPT | Performed by: OBSTETRICS & GYNECOLOGY

## 2017-06-29 PROCEDURE — 76810 OB US >/= 14 WKS ADDL FETUS: CPT | Performed by: OBSTETRICS & GYNECOLOGY

## 2017-06-29 PROCEDURE — 76805 OB US >/= 14 WKS SNGL FETUS: CPT | Performed by: OBSTETRICS & GYNECOLOGY

## 2017-06-29 PROCEDURE — 99212 OFFICE O/P EST SF 10 MIN: CPT | Performed by: OBSTETRICS & GYNECOLOGY

## 2017-06-29 NOTE — PROGRESS NOTES
Sohail Jama is a 43year old female.        Reason for Consult:        Mono/di Twins.  AMA.    Natural conception.     OB History:          S:  No problems or complaints. She reports good FM, no contractions or bleeding.  Doing well.   We revi TRAP is a rare complication of monochorionic twins in which a living twin perfuses a nonliving (acardiac) twin through patent vascular channels.   · Selective fetal growth restriction due unequal placental sharing (discordance in placental territory) – Lucy placenta.  Earlier delivery if indication arises.     Thank you for allowing me to participate in the care of your patient.  Please do not hesitate to call with any questions or concerns.      Total patient time was 15 minutes in evaluation, consultation,

## 2017-06-29 NOTE — PROGRESS NOTES
[facility], [facility]  Obstetrics and Gynecology  Prenatal Visit  Nguyễn Davis MD    LON Turner is a 43year old.o. [id number] 26w4d weeks. Patient currently without complaints. She denies any significant cramping, contractions or bleeding.   Juliane weeks.  Reviewed previous maternal-fetal medicine ultrasound reports. Discussed options for mode of delivery that include vaginal delivery of baby A and baby B are both vertex and  delivery if baby is a is in a malpresentation.   I counseled michael

## 2017-07-06 ENCOUNTER — HOSPITAL ENCOUNTER (OUTPATIENT)
Facility: HOSPITAL | Age: 37
Setting detail: OBSERVATION
Discharge: HOME | End: 2017-07-06
Attending: OBSTETRICS & GYNECOLOGY | Admitting: OBSTETRICS & GYNECOLOGY
Payer: COMMERCIAL

## 2017-07-06 ENCOUNTER — TELEPHONE (OUTPATIENT)
Dept: PEDIATRICS CLINIC | Facility: CLINIC | Age: 37
End: 2017-07-06

## 2017-07-06 PROBLEM — O30.042 DICHORIONIC DIAMNIOTIC TWIN PREGNANCY IN SECOND TRIMESTER: Status: ACTIVE | Noted: 2017-02-27

## 2017-07-06 PROCEDURE — 59025 FETAL NON-STRESS TEST: CPT | Performed by: OBSTETRICS & GYNECOLOGY

## 2017-07-06 NOTE — TRIAGE
[facility]      Triage Note    Kayleigh Bocanegra Patient Status:  Observation    10/17/1980 MRN [id number]   Location [address] [facility] Attending Sophie Meyer MD   Hosp Day # 0 PCP [facility] tracing) Reactive NST. Dr Mariella Beasley notified and orders recd to discharge to home. Pt educated on fetal kick counts, hydration . Pt verbalized understanding. Denette Castleman, RN  7/6/2017 1:32 PM  NST Reactive.   I agree with the above stated find

## 2017-07-06 NOTE — TELEPHONE ENCOUNTER
Pt voices she has been feeling less and less movement from her twins the past 3-4 days. Pt voices she has been drinking ice cold water and monitoring movements, but movements are less. No vaginal bleeding or leaking of fluid. No cramping or contractions.  P

## 2017-07-07 ENCOUNTER — HOSPITAL ENCOUNTER (OUTPATIENT)
Dept: PERINATAL CARE | Facility: HOSPITAL | Age: 37
Discharge: HOME | End: 2017-07-07
Attending: OBSTETRICS & GYNECOLOGY
Payer: COMMERCIAL

## 2017-07-07 VITALS — SYSTOLIC BLOOD PRESSURE: 92 MMHG | HEART RATE: 83 BPM | DIASTOLIC BLOOD PRESSURE: 58 MMHG

## 2017-07-07 DIAGNOSIS — O09.523 AMA (ADVANCED MATERNAL AGE) MULTIGRAVIDA 35+, THIRD TRIMESTER: Primary | ICD-10-CM

## 2017-07-07 DIAGNOSIS — O30.039 MONOCHORIONIC DIAMNIOTIC TWIN PREGNANCY: ICD-10-CM

## 2017-07-07 DIAGNOSIS — O09.523 AMA (ADVANCED MATERNAL AGE) MULTIGRAVIDA 35+, THIRD TRIMESTER: ICD-10-CM

## 2017-07-07 PROCEDURE — 76815 OB US LIMITED FETUS(S): CPT | Performed by: OBSTETRICS & GYNECOLOGY

## 2017-07-07 PROCEDURE — 76820 UMBILICAL ARTERY ECHO: CPT | Performed by: OBSTETRICS & GYNECOLOGY

## 2017-07-07 PROCEDURE — 76821 MIDDLE CEREBRAL ARTERY ECHO: CPT | Performed by: OBSTETRICS & GYNECOLOGY

## 2017-07-07 PROCEDURE — 99213 OFFICE O/P EST LOW 20 MIN: CPT | Performed by: OBSTETRICS & GYNECOLOGY

## 2017-07-07 NOTE — PROGRESS NOTES
Honey Nicole is a 43year old female.        Reason for Consult:        Mono/di Twins.  AMA.    Natural conception.     OB History:          S:  No problems or complaints.  She reports good FM, no contractions or bleeding.  Doing well.   We revi morbidity or mortality in the co-twin due to their shared placental circulation. · Monochorionic twins have a higher rate of congenital anomalies than dichorionic twins and singletons. The anomalies have a high rate of concordance, but can be discordant.

## 2017-07-10 RX ORDER — MEPERIDINE HYDROCHLORIDE 50 MG/ML
INJECTION INTRAMUSCULAR; INTRAVENOUS; SUBCUTANEOUS
Status: DISPENSED
Start: 2017-07-10 | End: 2017-07-10

## 2017-07-13 ENCOUNTER — TELEPHONE (OUTPATIENT)
Dept: OBGYN CLINIC | Facility: CLINIC | Age: 37
End: 2017-07-13

## 2017-07-13 NOTE — TELEPHONE ENCOUNTER
PER PT REQUESTING A COPY OF THE ALTERNATIVE TEST FOR THE GLUCOSE TEST / PT STATE THAT YOU TEXT  IT TO HER LAST TIME / PLS ADV

## 2017-07-14 ENCOUNTER — LAB ENCOUNTER (OUTPATIENT)
Dept: LAB | Facility: HOSPITAL | Age: 37
End: 2017-07-14
Attending: OBSTETRICS & GYNECOLOGY
Payer: COMMERCIAL

## 2017-07-14 DIAGNOSIS — Z34.83 OTHER NORMAL PREGNANCY, NOT FIRST, THIRD TRIMESTER: ICD-10-CM

## 2017-07-14 LAB
BASOPHILS # BLD: 0 K/UL (ref 0–0.2)
BASOPHILS NFR BLD: 0 %
EOSINOPHIL # BLD: 0.1 K/UL (ref 0–0.7)
EOSINOPHIL NFR BLD: 1 %
ERYTHROCYTE [DISTWIDTH] IN BLOOD BY AUTOMATED COUNT: 14.8 % (ref 11–15)
GLUCOSE 1H P 50 G GLC PO SERPL-MCNC: 106 MG/DL
HCT VFR BLD AUTO: 38.1 % (ref 35–48)
HGB BLD-MCNC: 12.6 G/DL (ref 12–16)
LYMPHOCYTES # BLD: 1.7 K/UL (ref 1–4)
LYMPHOCYTES NFR BLD: 14 %
MCH RBC QN AUTO: 29.6 PG (ref 27–32)
MCHC RBC AUTO-ENTMCNC: 33 G/DL (ref 32–37)
MCV RBC AUTO: 89.4 FL (ref 80–100)
MONOCYTES # BLD: 0.7 K/UL (ref 0–1)
MONOCYTES NFR BLD: 6 %
NEUTROPHILS # BLD AUTO: 10 K/UL (ref 1.8–7.7)
NEUTROPHILS NFR BLD: 80 %
PLATELET # BLD AUTO: 205 K/UL (ref 140–400)
PMV BLD AUTO: 9.1 FL (ref 7.4–10.3)
RBC # BLD AUTO: 4.26 M/UL (ref 3.7–5.4)
WBC # BLD AUTO: 12.5 K/UL (ref 4–11)

## 2017-07-14 PROCEDURE — 85025 COMPLETE CBC W/AUTO DIFF WBC: CPT

## 2017-07-14 PROCEDURE — 82950 GLUCOSE TEST: CPT

## 2017-07-14 PROCEDURE — 36415 COLL VENOUS BLD VENIPUNCTURE: CPT

## 2017-07-19 ENCOUNTER — ROUTINE PRENATAL (OUTPATIENT)
Dept: OBGYN CLINIC | Facility: CLINIC | Age: 37
End: 2017-07-19

## 2017-07-19 ENCOUNTER — TELEPHONE (OUTPATIENT)
Dept: OBGYN CLINIC | Facility: CLINIC | Age: 37
End: 2017-07-19

## 2017-07-19 VITALS
WEIGHT: 217.81 LBS | HEART RATE: 80 BPM | DIASTOLIC BLOOD PRESSURE: 72 MMHG | SYSTOLIC BLOOD PRESSURE: 108 MMHG | BODY MASS INDEX: 34 KG/M2

## 2017-07-19 DIAGNOSIS — IMO0001 TWINS: ICD-10-CM

## 2017-07-19 DIAGNOSIS — Z34.83 ENCOUNTER FOR SUPERVISION OF OTHER NORMAL PREGNANCY IN THIRD TRIMESTER: Primary | ICD-10-CM

## 2017-07-19 RX ORDER — BREAST PUMP
EACH MISCELLANEOUS
Qty: 1 EACH | Refills: 0 | Status: SHIPPED | OUTPATIENT
Start: 2017-07-19

## 2017-07-19 RX ORDER — BREAST PUMP
EACH MISCELLANEOUS
Qty: 1 EACH | Refills: 0 | Status: SHIPPED | OUTPATIENT
Start: 2017-07-19 | End: 2017-07-19

## 2017-07-19 NOTE — PROGRESS NOTES
Denies HA, dizziness, URQ, swelling. Babies are active and both girls. O. See above  A. Twin gestation, monochorionic diamniotic  Declines vaccines  AMA  Rubella non-immune  BMI 31  P.   Chart reviewed [id number] EDD9/3017, Ges Age30 w 4 d, Problem list upd

## 2017-07-20 ENCOUNTER — HOSPITAL ENCOUNTER (OUTPATIENT)
Dept: PERINATAL CARE | Facility: HOSPITAL | Age: 37
Discharge: HOME | End: 2017-07-20
Attending: OBSTETRICS & GYNECOLOGY
Payer: COMMERCIAL

## 2017-07-20 VITALS — SYSTOLIC BLOOD PRESSURE: 113 MMHG | HEART RATE: 92 BPM | DIASTOLIC BLOOD PRESSURE: 65 MMHG

## 2017-07-20 DIAGNOSIS — O09.523 AMA (ADVANCED MATERNAL AGE) MULTIGRAVIDA 35+, THIRD TRIMESTER: ICD-10-CM

## 2017-07-20 DIAGNOSIS — O30.033 MONOCHORIONIC DIAMNIOTIC TWIN GESTATION IN THIRD TRIMESTER: ICD-10-CM

## 2017-07-20 DIAGNOSIS — O30.033 MONOCHORIONIC DIAMNIOTIC TWIN GESTATION IN THIRD TRIMESTER: Primary | ICD-10-CM

## 2017-07-20 PROCEDURE — 76820 UMBILICAL ARTERY ECHO: CPT | Performed by: OBSTETRICS & GYNECOLOGY

## 2017-07-20 PROCEDURE — 76805 OB US >/= 14 WKS SNGL FETUS: CPT | Performed by: OBSTETRICS & GYNECOLOGY

## 2017-07-20 PROCEDURE — 76810 OB US >/= 14 WKS ADDL FETUS: CPT | Performed by: OBSTETRICS & GYNECOLOGY

## 2017-07-20 PROCEDURE — 76821 MIDDLE CEREBRAL ARTERY ECHO: CPT | Performed by: OBSTETRICS & GYNECOLOGY

## 2017-07-20 PROCEDURE — 99212 OFFICE O/P EST SF 10 MIN: CPT | Performed by: OBSTETRICS & GYNECOLOGY

## 2017-07-20 NOTE — PROGRESS NOTES
Pt here for level two ultrasound for twin gestation.    AMA  High BMI  Denies pregnancy complaints and states feeling fetal movement

## 2017-07-20 NOTE — PROGRESS NOTES
Lala Treadwell is a 43year old female.        Reason for Consult:        Mono/di Twins.  AMA.    Natural conception.     OB History:          S:  No problems or complaints.  She reports good FM, no contractions or bleeding.    NARRATIVE:

## 2017-07-24 NOTE — ADDENDUM NOTE
Encounter addended by:  Olegario Levy MD on: 7/24/2017 12:07 PM<BR>    Actions taken: Charge Capture section accepted

## 2017-07-31 ENCOUNTER — TELEPHONE (OUTPATIENT)
Dept: PEDIATRICS CLINIC | Facility: CLINIC | Age: 37
End: 2017-07-31

## 2017-07-31 ENCOUNTER — HOSPITAL ENCOUNTER (OUTPATIENT)
Facility: HOSPITAL | Age: 37
Discharge: HOME | End: 2017-07-31
Attending: OBSTETRICS & GYNECOLOGY | Admitting: OBSTETRICS & GYNECOLOGY
Payer: COMMERCIAL

## 2017-07-31 PROCEDURE — 59025 FETAL NON-STRESS TEST: CPT | Performed by: OBSTETRICS & GYNECOLOGY

## 2017-07-31 NOTE — NST
Nonstress Test   Patient: Yael Trammell    Gestation: 31w2d    NST:       Variability: Moderate    Variability - Fetus B: Moderate      Accelerations: Yes    Accelerations -  Fetus B: Yes      Decelerations: None     Decelerations - Fetus B: None AM

## 2017-07-31 NOTE — TELEPHONE ENCOUNTER
Per pt baby A has been moving well, but has noticed a decrease in baby B movement since last week. Per pt it has become more and more noticeable and is concerned. Had breakfast two hours ago and still doesn't recall feeling baby B move.    Offered pt to

## 2017-08-01 ENCOUNTER — HOSPITAL ENCOUNTER (INPATIENT)
Facility: HOSPITAL | Age: 37
LOS: 1 days | Discharge: HOME | DRG: 778 | End: 2017-08-02
Attending: OBSTETRICS & GYNECOLOGY | Admitting: OBSTETRICS & GYNECOLOGY
Payer: COMMERCIAL

## 2017-08-01 PROBLEM — O60.00 PRETERM LABOR: Status: ACTIVE | Noted: 2017-08-01

## 2017-08-01 PROBLEM — Z34.90 PREGNANCY: Status: ACTIVE | Noted: 2017-08-01

## 2017-08-01 LAB
ANTIBODY SCREEN: NEGATIVE
BASOPHILS # BLD: 0 K/UL (ref 0–0.2)
BASOPHILS NFR BLD: 0 %
EOSINOPHIL # BLD: 0.1 K/UL (ref 0–0.7)
EOSINOPHIL NFR BLD: 1 %
ERYTHROCYTE [DISTWIDTH] IN BLOOD BY AUTOMATED COUNT: 14.3 % (ref 11–15)
FIBRONECTIN FETAL SPEC QL: POSITIVE
HCT VFR BLD AUTO: 36.6 % (ref 35–48)
HGB BLD-MCNC: 12.2 G/DL (ref 12–16)
LYMPHOCYTES # BLD: 2.2 K/UL (ref 1–4)
LYMPHOCYTES NFR BLD: 21 %
MAGNESIUM SERPL-MCNC: 4.8 MG/DL (ref 4.8–8.4)
MAGNESIUM SERPL-MCNC: 4.8 MG/DL (ref 4.8–8.4)
MCH RBC QN AUTO: 29.6 PG (ref 27–32)
MCHC RBC AUTO-ENTMCNC: 33.3 G/DL (ref 32–37)
MCV RBC AUTO: 89 FL (ref 80–100)
MONOCYTES # BLD: 0.8 K/UL (ref 0–1)
MONOCYTES NFR BLD: 7 %
NEUTROPHILS # BLD AUTO: 7.2 K/UL (ref 1.8–7.7)
NEUTROPHILS NFR BLD: 70 %
PLATELET # BLD AUTO: 185 K/UL (ref 140–400)
PMV BLD AUTO: 9.7 FL (ref 7.4–10.3)
RBC # BLD AUTO: 4.12 M/UL (ref 3.7–5.4)
RH BLOOD TYPE: POSITIVE
WBC # BLD AUTO: 10.3 K/UL (ref 4–11)

## 2017-08-01 PROCEDURE — 59025 FETAL NON-STRESS TEST: CPT | Performed by: OBSTETRICS & GYNECOLOGY

## 2017-08-01 PROCEDURE — 99223 1ST HOSP IP/OBS HIGH 75: CPT | Performed by: OBSTETRICS & GYNECOLOGY

## 2017-08-01 RX ORDER — SODIUM CHLORIDE, SODIUM LACTATE, POTASSIUM CHLORIDE, CALCIUM CHLORIDE 600; 310; 30; 20 MG/100ML; MG/100ML; MG/100ML; MG/100ML
INJECTION, SOLUTION INTRAVENOUS CONTINUOUS
Status: DISCONTINUED | OUTPATIENT
Start: 2017-08-01 | End: 2017-08-02

## 2017-08-01 RX ORDER — BETAMETHASONE SODIUM PHOSPHATE AND BETAMETHASONE ACETATE 3; 3 MG/ML; MG/ML
12 INJECTION, SUSPENSION INTRA-ARTICULAR; INTRALESIONAL; INTRAMUSCULAR; SOFT TISSUE EVERY 24 HOURS
Status: COMPLETED | OUTPATIENT
Start: 2017-08-01 | End: 2017-08-02

## 2017-08-01 RX ORDER — SODIUM CHLORIDE, SODIUM LACTATE, POTASSIUM CHLORIDE, CALCIUM CHLORIDE 600; 310; 30; 20 MG/100ML; MG/100ML; MG/100ML; MG/100ML
INJECTION, SOLUTION INTRAVENOUS
Status: COMPLETED
Start: 2017-08-01 | End: 2017-08-01

## 2017-08-01 RX ORDER — CALCIUM CARBONATE 200(500)MG
1000 TABLET,CHEWABLE ORAL
Status: DISCONTINUED | OUTPATIENT
Start: 2017-08-01 | End: 2017-08-02

## 2017-08-01 RX ORDER — CALCIUM GLUCONATE 94 MG/ML
1 INJECTION, SOLUTION INTRAVENOUS ONCE AS NEEDED
Status: DISPENSED | OUTPATIENT
Start: 2017-08-01 | End: 2017-08-01

## 2017-08-01 RX ORDER — ZOLPIDEM TARTRATE 5 MG/1
5 TABLET ORAL NIGHTLY PRN
Status: DISCONTINUED | OUTPATIENT
Start: 2017-08-01 | End: 2017-08-02

## 2017-08-01 RX ORDER — ACETAMINOPHEN 500 MG
500 TABLET ORAL EVERY 6 HOURS PRN
Status: DISCONTINUED | OUTPATIENT
Start: 2017-08-01 | End: 2017-08-02

## 2017-08-01 RX ORDER — ACETAMINOPHEN 500 MG
1000 TABLET ORAL EVERY 6 HOURS PRN
Status: DISCONTINUED | OUTPATIENT
Start: 2017-08-01 | End: 2017-08-02

## 2017-08-01 RX ORDER — DOCUSATE SODIUM 100 MG/1
100 CAPSULE, LIQUID FILLED ORAL 2 TIMES DAILY
Status: DISCONTINUED | OUTPATIENT
Start: 2017-08-01 | End: 2017-08-02

## 2017-08-01 RX ORDER — SODIUM CHLORIDE 0.9 % (FLUSH) 0.9 %
10 SYRINGE (ML) INJECTION AS NEEDED
Status: DISCONTINUED | OUTPATIENT
Start: 2017-08-01 | End: 2017-08-02

## 2017-08-01 NOTE — CONSULTS
Neonatology was asked to consult and discuss potential  delivery and outcome with this patient who presents with 31 3/7 week twins in labor. The father/SO was present, Harrison Pulliam L&D RN and Luis Liu RN. Maternal History:  42 y/o  female.  Shana

## 2017-08-01 NOTE — H&P
27 [facility] Patient Status:  Observation    10/17/1980 MRN [id number]   Location [address]  Attending Charisse Gleason MD   Hosp Day # 0 PCP Adalberto Shah     Date of 04/27/17 1854    HGB 12.3 g/dL 12.0 - 16.0 g/dL 04/27/17 1854    MCV 89.6 fL 80.0 - 100.0 fL 04/27/17 1854    Platelets 118 K/ - 400 K/UL 04/27/17 1854    Rubella Equivocal  (A) Immune 04/27/17 1854    TREP Negative  Negative 04/27/17 1854    Urine C 05/03/13 1215    HgB A1c 5.4 % 4.0 - 6.0 % 04/27/17 1854    HGB Electrophoresis        Varicella Zoster        Cystic Fibrosis-Old         Cystic Fibrosis[32] (Required questions in OE to answer)        Cystic Fibrosis[165] (Required questions in OE to ans Diabetes Father 62     Type II   • Cancer Maternal Grandmother      lung-nonsmoker   • Thyroid Disorder Sister      Hyperthyroidism     Social History:    Smoking status: Former Smoker  For 5.00 Years     Types: Cigarettes    Smokeless tobacco: Never Used ultrasound yesterday at her nonstress test.    Results:     Lab Results  Component Value Date   TREPONEMALAB Negative 04/27/2017   HIV Nonreactive  04/27/2017   HBVSAG Nonreactive  04/27/2017   ABO O 04/27/2017   RH Positive 04/27/2017   WBC 12.5 (H) 07/14

## 2017-08-01 NOTE — CONSULTS
[facility] Patient Status:  Inpatient    10/17/1980 MRN [id number]   Location [address] [place] [address] Attending Annika Joseph MD   Hosp Day # 0 PCP Meryl Sterling     SUBJECTIVE:  Reason # Outcome Date GA Lbr Sigifredo/2nd Weight Sex Delivery Anes PTL Lv   6 Current            5 Term 05/29/13 40w0d  9 lb (4.082 kg) M NORMAL SPONT None N NOE      Complications: Group B beta strep +   4 TAB 01/01/10           3 Term 05/13/01 40w0d  8 lb 3 oz (3. mortality associated with prematurity at various gestational ages. The signs and symptoms of  labor were discussed. We talked about potential risks and benefits associated with tocolytic therapy and  steroid.        Thank you for allowing

## 2017-08-02 VITALS
HEIGHT: 67 IN | SYSTOLIC BLOOD PRESSURE: 99 MMHG | HEART RATE: 80 BPM | WEIGHT: 217 LBS | DIASTOLIC BLOOD PRESSURE: 74 MMHG | TEMPERATURE: 98 F | RESPIRATION RATE: 18 BRPM | OXYGEN SATURATION: 97 % | BODY MASS INDEX: 34.06 KG/M2

## 2017-08-02 LAB
MAGNESIUM SERPL-MCNC: 4.6 MG/DL (ref 4.8–8.4)
MAGNESIUM SERPL-MCNC: 4.9 MG/DL (ref 4.8–8.4)

## 2017-08-02 NOTE — PROGRESS NOTES
[facility]    OB/GYNE Progress Note      John Blair Patient Status:  Inpatient    10/17/1980 MRN [id number]   Location [address]  Attending Néstor Farah MD   Hosp Day # 1 PCP Mimi Gonzalez TREPONEMALAB Negative 04/27/2017   HBVSAG Nonreactive  04/27/2017   ABO O 08/01/2017   RH Positive 08/01/2017   WBC 10.3 08/01/2017   HGB 12.2 08/01/2017   HCT 36.6 08/01/2017    08/01/2017   TSH 2.40 04/27/2017         Lab Results  Component Valu

## 2017-08-02 NOTE — PROGRESS NOTES
Patient discharged in stable condition. Kick count,  labor, preeclampsia handouts given and reviewed. Patient verbalizes having appointment on Thursday.

## 2017-08-03 ENCOUNTER — HOSPITAL ENCOUNTER (OUTPATIENT)
Dept: PERINATAL CARE | Facility: HOSPITAL | Age: 37
Discharge: HOME | End: 2017-08-03
Attending: OBSTETRICS & GYNECOLOGY
Payer: COMMERCIAL

## 2017-08-03 DIAGNOSIS — O09.523 AMA (ADVANCED MATERNAL AGE) MULTIGRAVIDA 35+, THIRD TRIMESTER: ICD-10-CM

## 2017-08-03 DIAGNOSIS — O30.033 MONOCHORIONIC DIAMNIOTIC TWIN GESTATION IN THIRD TRIMESTER: Primary | ICD-10-CM

## 2017-08-03 DIAGNOSIS — O30.033 MONOCHORIONIC DIAMNIOTIC TWIN GESTATION IN THIRD TRIMESTER: ICD-10-CM

## 2017-08-03 PROCEDURE — 99212 OFFICE O/P EST SF 10 MIN: CPT | Performed by: OBSTETRICS & GYNECOLOGY

## 2017-08-03 PROCEDURE — 76815 OB US LIMITED FETUS(S): CPT | Performed by: OBSTETRICS & GYNECOLOGY

## 2017-08-03 PROCEDURE — 76820 UMBILICAL ARTERY ECHO: CPT | Performed by: OBSTETRICS & GYNECOLOGY

## 2017-08-03 PROCEDURE — 76821 MIDDLE CEREBRAL ARTERY ECHO: CPT | Performed by: OBSTETRICS & GYNECOLOGY

## 2017-08-03 NOTE — PROGRESS NOTES
Pt for growth U/S  Hx mono di twins   8/1 -8/2 for mgso4 and sterioids  ptl 1 cm  Denies pregnancy complaints  States active fetus

## 2017-08-03 NOTE — ADDENDUM NOTE
Encounter addended by: Keyona Dexter on: 8/3/2017 12:18 PM<BR>    Actions taken: Charge Capture section accepted

## 2017-08-03 NOTE — PROGRESS NOTES
Vijay Weathers is a 43year old female.        Reason for Consult:        Mono/di Twins.  AMA.    Natural conception.     OB History:          S:  No problems or complaints.  She reports good FM, no contractions or bleeding.    NARRATIVE:

## 2017-08-04 ENCOUNTER — ROUTINE PRENATAL (OUTPATIENT)
Dept: OBGYN CLINIC | Facility: CLINIC | Age: 37
End: 2017-08-04

## 2017-08-04 VITALS
SYSTOLIC BLOOD PRESSURE: 98 MMHG | HEART RATE: 77 BPM | DIASTOLIC BLOOD PRESSURE: 65 MMHG | WEIGHT: 223.38 LBS | BODY MASS INDEX: 35 KG/M2

## 2017-08-04 DIAGNOSIS — Z34.83 ENCOUNTER FOR SUPERVISION OF OTHER NORMAL PREGNANCY IN THIRD TRIMESTER: Primary | ICD-10-CM

## 2017-08-04 LAB
APPEARANCE: CLEAR
MULTISTIX LOT#: NORMAL NUMERIC
PH, URINE: 7.5 (ref 4.5–8)
SPECIFIC GRAVITY: 1.01 (ref 1–1.03)
URINE-COLOR: YELLOW
UROBILINOGEN,SEMI-QN: 0.2 MG/DL (ref 0–1.9)

## 2017-08-04 NOTE — PROGRESS NOTES
Pt feels well, no c/o ctxs,  Has mfm u/s q2 weeks and weekly nst.   Counseled, all questions answered.

## 2017-08-08 ENCOUNTER — HOSPITAL ENCOUNTER (OUTPATIENT)
Facility: HOSPITAL | Age: 37
Discharge: HOME | End: 2017-08-08
Attending: OBSTETRICS & GYNECOLOGY | Admitting: OBSTETRICS & GYNECOLOGY
Payer: COMMERCIAL

## 2017-08-08 ENCOUNTER — APPOINTMENT (OUTPATIENT)
Dept: OBGYN CLINIC | Facility: HOSPITAL | Age: 37
End: 2017-08-08
Payer: COMMERCIAL

## 2017-08-08 PROCEDURE — 59025 FETAL NON-STRESS TEST: CPT | Performed by: OBSTETRICS & GYNECOLOGY

## 2017-08-08 NOTE — NST
Nonstress Test   Patient: Marquez Saini    Gestation: 32w3d    NST:Twins       Variability: Moderate    Variability - Fetus B: Moderate      Accelerations: Yes    Accelerations -  Fetus B: Yes      Decelerations: None     Decelerations - Fetus B: None

## 2017-08-11 ENCOUNTER — ROUTINE PRENATAL (OUTPATIENT)
Dept: OBGYN CLINIC | Facility: CLINIC | Age: 37
End: 2017-08-11

## 2017-08-11 VITALS
HEART RATE: 84 BPM | SYSTOLIC BLOOD PRESSURE: 100 MMHG | DIASTOLIC BLOOD PRESSURE: 58 MMHG | WEIGHT: 222.63 LBS | BODY MASS INDEX: 35 KG/M2

## 2017-08-11 DIAGNOSIS — Z34.83 ENCOUNTER FOR SUPERVISION OF OTHER NORMAL PREGNANCY IN THIRD TRIMESTER: Primary | ICD-10-CM

## 2017-08-11 PROBLEM — O30.042 DICHORIONIC DIAMNIOTIC TWIN PREGNANCY IN SECOND TRIMESTER: Status: RESOLVED | Noted: 2017-02-27 | Resolved: 2017-08-11

## 2017-08-11 LAB
KETONES (URINE DIPSTICK): 80 MG/DL
MULTISTIX LOT#: NORMAL NUMERIC
PH, URINE: 6 (ref 4.5–8)
SPECIFIC GRAVITY: 1.02 (ref 1–1.03)
URINE-COLOR: YELLOW
UROBILINOGEN,SEMI-QN: 0.2 MG/DL (ref 0–1.9)

## 2017-08-11 NOTE — PROGRESS NOTES
Patient was admitted on  -  with  labor. She received magnesium sulfate for neuro prophylaxis as well as steroids for fetal lung maturity enhancement and antibiotics.   She [place] regularly [place] maternal[place]fetal medicine [facility]

## 2017-08-15 ENCOUNTER — HOSPITAL ENCOUNTER (OUTPATIENT)
Facility: HOSPITAL | Age: 37
Discharge: HOME | End: 2017-08-15
Attending: OBSTETRICS & GYNECOLOGY | Admitting: OBSTETRICS & GYNECOLOGY
Payer: COMMERCIAL

## 2017-08-15 ENCOUNTER — APPOINTMENT (OUTPATIENT)
Dept: OBGYN CLINIC | Facility: HOSPITAL | Age: 37
End: 2017-08-15
Payer: COMMERCIAL

## 2017-08-15 VITALS — DIASTOLIC BLOOD PRESSURE: 66 MMHG | HEART RATE: 85 BPM | SYSTOLIC BLOOD PRESSURE: 112 MMHG

## 2017-08-15 PROCEDURE — 59025 FETAL NON-STRESS TEST: CPT | Performed by: OBSTETRICS & GYNECOLOGY

## 2017-08-15 NOTE — NST
Nonstress Test   Patient: Dana [facility]    Gestation: 33w3d    NST: Twins, AMA       Variability: Moderate    Variability - Fetus B: Moderate      Accelerations: Yes    Accelerations -  Fetus B: Yes      Decelerations: None     Decelerations - Fetus B:

## 2017-08-17 ENCOUNTER — HOSPITAL ENCOUNTER (OUTPATIENT)
Dept: PERINATAL CARE | Facility: HOSPITAL | Age: 37
Discharge: HOME | End: 2017-08-17
Attending: OBSTETRICS & GYNECOLOGY
Payer: COMMERCIAL

## 2017-08-17 VITALS — SYSTOLIC BLOOD PRESSURE: 112 MMHG | HEART RATE: 94 BPM | DIASTOLIC BLOOD PRESSURE: 71 MMHG

## 2017-08-17 DIAGNOSIS — O30.033 MONOCHORIONIC DIAMNIOTIC TWIN GESTATION IN THIRD TRIMESTER: ICD-10-CM

## 2017-08-17 DIAGNOSIS — O09.523 AMA (ADVANCED MATERNAL AGE) MULTIGRAVIDA 35+, THIRD TRIMESTER: ICD-10-CM

## 2017-08-17 DIAGNOSIS — O30.033 MONOCHORIONIC DIAMNIOTIC TWIN GESTATION IN THIRD TRIMESTER: Primary | ICD-10-CM

## 2017-08-17 PROCEDURE — 76819 FETAL BIOPHYS PROFIL W/O NST: CPT | Performed by: OBSTETRICS & GYNECOLOGY

## 2017-08-17 PROCEDURE — 76805 OB US >/= 14 WKS SNGL FETUS: CPT | Performed by: OBSTETRICS & GYNECOLOGY

## 2017-08-17 PROCEDURE — 76810 OB US >/= 14 WKS ADDL FETUS: CPT | Performed by: OBSTETRICS & GYNECOLOGY

## 2017-08-17 PROCEDURE — 76821 MIDDLE CEREBRAL ARTERY ECHO: CPT | Performed by: OBSTETRICS & GYNECOLOGY

## 2017-08-17 PROCEDURE — 76820 UMBILICAL ARTERY ECHO: CPT | Performed by: OBSTETRICS & GYNECOLOGY

## 2017-08-17 PROCEDURE — 99212 OFFICE O/P EST SF 10 MIN: CPT | Performed by: OBSTETRICS & GYNECOLOGY

## 2017-08-17 NOTE — ADDENDUM NOTE
Encounter addended by: Erick Reardon on: 8/17/2017  1:07 PM<BR>    Actions taken: Charge Capture section accepted

## 2017-08-17 NOTE — PROGRESS NOTES
Dorothea Smiley is a 43year old female.        Reason for Consult:        Mono/di Twins.  AMA.    Natural conception.     OB History:          S:  No problems or complaints.  She reports good FM, no contractions or bleeding.    NARRATIVE:

## 2017-08-18 ENCOUNTER — ROUTINE PRENATAL (OUTPATIENT)
Dept: OBGYN CLINIC | Facility: CLINIC | Age: 37
End: 2017-08-18

## 2017-08-18 VITALS
SYSTOLIC BLOOD PRESSURE: 102 MMHG | DIASTOLIC BLOOD PRESSURE: 71 MMHG | BODY MASS INDEX: 36 KG/M2 | HEART RATE: 85 BPM | WEIGHT: 227 LBS

## 2017-08-18 DIAGNOSIS — Z34.83 ENCOUNTER FOR SUPERVISION OF OTHER NORMAL PREGNANCY IN THIRD TRIMESTER: ICD-10-CM

## 2017-08-18 DIAGNOSIS — O30.039 MONOCHORIONIC DIAMNIOTIC TWIN PREGNANCY, ANTEPARTUM: Primary | ICD-10-CM

## 2017-08-18 LAB
MULTISTIX LOT#: NORMAL NUMERIC
PH, URINE: 6 (ref 4.5–8)
SPECIFIC GRAVITY: 1.02 (ref 1–1.03)
UROBILINOGEN,SEMI-QN: 0 MG/DL (ref 0–1.9)

## 2017-08-18 NOTE — PROGRESS NOTES
[address] [facility]  Obstetrics and Gynecology  Prenatal Visit  Rona Tamayo MD    LON Horn is a 43year old.o. [id number] 33w6d weeks. Patient without any complaints today.   She is having normal fetal movement and can distinguish between the 2 diagnosis)    (Z34.83) Encounter for supervision of other normal pregnancy in third trimester  Plan: [facility] W/O SCOPE    Plan   Continue regular  testing and maternal-fetal medicine ultrasounds.  labor instructions given.   Disc

## 2017-08-22 ENCOUNTER — HOSPITAL ENCOUNTER (OUTPATIENT)
Dept: OBGYN CLINIC | Facility: HOSPITAL | Age: 37
Discharge: HOME | End: 2017-08-22
Payer: COMMERCIAL

## 2017-08-22 ENCOUNTER — HOSPITAL ENCOUNTER (OUTPATIENT)
Facility: HOSPITAL | Age: 37
Discharge: HOME | End: 2017-08-22
Attending: OBSTETRICS & GYNECOLOGY | Admitting: OBSTETRICS & GYNECOLOGY
Payer: COMMERCIAL

## 2017-08-22 PROCEDURE — 59025 FETAL NON-STRESS TEST: CPT

## 2017-08-22 NOTE — NST
Nonstress Test   Patient: Oxana Minor    Gestation: 34w3d    NST: Twins       Variability: Moderate    Variability - Fetus B: Moderate      Accelerations: Yes    Accelerations -  Fetus B: Yes      Decelerations: None     Decelerations - Fetus B: None

## 2017-08-25 ENCOUNTER — ROUTINE PRENATAL (OUTPATIENT)
Dept: OBGYN CLINIC | Facility: CLINIC | Age: 37
End: 2017-08-25

## 2017-08-25 ENCOUNTER — TELEPHONE (OUTPATIENT)
Dept: OBGYN CLINIC | Facility: CLINIC | Age: 37
End: 2017-08-25

## 2017-08-25 VITALS
WEIGHT: 232 LBS | HEART RATE: 74 BPM | BODY MASS INDEX: 36 KG/M2 | DIASTOLIC BLOOD PRESSURE: 73 MMHG | SYSTOLIC BLOOD PRESSURE: 112 MMHG

## 2017-08-25 DIAGNOSIS — Z34.83 ENCOUNTER FOR SUPERVISION OF OTHER NORMAL PREGNANCY IN THIRD TRIMESTER: Primary | ICD-10-CM

## 2017-08-25 DIAGNOSIS — O30.039 MONOCHORIONIC DIAMNIOTIC TWIN PREGNANCY, ANTEPARTUM: ICD-10-CM

## 2017-08-25 DIAGNOSIS — O09.529 SUPERVISION OF ELDERLY MULTIGRAVIDA, UNSPECIFIED TRIMESTER: ICD-10-CM

## 2017-08-25 LAB
APPEARANCE: CLEAR
MULTISTIX LOT#: NORMAL NUMERIC
PH, URINE: 6 (ref 4.5–8)
SPECIFIC GRAVITY: 1.01 (ref 1–1.03)
UROBILINOGEN,SEMI-QN: 0.2 MG/DL (ref 0–1.9)

## 2017-08-25 NOTE — PROGRESS NOTES
[facility]  Obstetrics and Gynecology  Prenatal Visit  MD LON uBtler   Lowell Tate is a 43year old.o. [id number] 34w6d weeks. Patient denies any regular contractions or bleeding.   She is having normal fetal movement and feeling both b scheduled  testing. Group B beta strep culture done. Labor instructions given.   [facility] has recommended delivery between 36 and 37 weeks and so she is scheduled for induction of labor at 36 weeks and 6 days on  if she does not go

## 2017-08-25 NOTE — TELEPHONE ENCOUNTER
Please schedule patient for induction of labor due to monochorionic diamniotic twin gestation on Friday, September 8. Please confirm with me that we are scheduled. We will take the earliest time slot available for her induction.

## 2017-08-29 ENCOUNTER — HOSPITAL ENCOUNTER (OUTPATIENT)
Facility: HOSPITAL | Age: 37
Discharge: HOME | End: 2017-08-29
Attending: OBSTETRICS & GYNECOLOGY | Admitting: OBSTETRICS & GYNECOLOGY
Payer: COMMERCIAL

## 2017-08-29 ENCOUNTER — HOSPITAL ENCOUNTER (OUTPATIENT)
Dept: OBGYN CLINIC | Facility: HOSPITAL | Age: 37
Discharge: HOME | End: 2017-08-29
Payer: COMMERCIAL

## 2017-08-29 VITALS — HEART RATE: 98 BPM | SYSTOLIC BLOOD PRESSURE: 104 MMHG | DIASTOLIC BLOOD PRESSURE: 70 MMHG

## 2017-08-29 PROBLEM — O30.043 DICHORIONIC DIAMNIOTIC TWIN PREGNANCY IN THIRD TRIMESTER: Status: ACTIVE | Noted: 2017-02-27

## 2017-08-29 PROCEDURE — 59025 FETAL NON-STRESS TEST: CPT | Performed by: OBSTETRICS & GYNECOLOGY

## 2017-08-29 NOTE — NST
Nonstress Test   Patient: Kayleigh Slim    Gestation: 35w3d    NST: Twins, AMA       Variability: Moderate    Variability - Fetus B: Moderate      Accelerations: Yes    Accelerations -  Fetus B: Yes      Decelerations: None     Decelerations - Fetus B:

## 2017-08-31 ENCOUNTER — HOSPITAL ENCOUNTER (OUTPATIENT)
Dept: PERINATAL CARE | Facility: HOSPITAL | Age: 37
Discharge: HOME | End: 2017-08-31
Attending: OBSTETRICS & GYNECOLOGY
Payer: COMMERCIAL

## 2017-08-31 VITALS — DIASTOLIC BLOOD PRESSURE: 68 MMHG | SYSTOLIC BLOOD PRESSURE: 106 MMHG | HEART RATE: 78 BPM

## 2017-08-31 DIAGNOSIS — O30.033 MONOCHORIONIC DIAMNIOTIC TWIN GESTATION IN THIRD TRIMESTER: ICD-10-CM

## 2017-08-31 DIAGNOSIS — O30.033 MONOCHORIONIC DIAMNIOTIC TWIN GESTATION IN THIRD TRIMESTER: Primary | ICD-10-CM

## 2017-08-31 DIAGNOSIS — O09.523 ELDERLY MULTIGRAVIDA IN THIRD TRIMESTER: ICD-10-CM

## 2017-08-31 PROCEDURE — 76821 MIDDLE CEREBRAL ARTERY ECHO: CPT | Performed by: OBSTETRICS & GYNECOLOGY

## 2017-08-31 PROCEDURE — 99213 OFFICE O/P EST LOW 20 MIN: CPT | Performed by: OBSTETRICS & GYNECOLOGY

## 2017-08-31 PROCEDURE — 76819 FETAL BIOPHYS PROFIL W/O NST: CPT | Performed by: OBSTETRICS & GYNECOLOGY

## 2017-08-31 PROCEDURE — 76820 UMBILICAL ARTERY ECHO: CPT | Performed by: OBSTETRICS & GYNECOLOGY

## 2017-08-31 PROCEDURE — 76805 OB US >/= 14 WKS SNGL FETUS: CPT | Performed by: OBSTETRICS & GYNECOLOGY

## 2017-08-31 NOTE — ADDENDUM NOTE
Encounter addended by: Angela York MD on: 8/31/2017 10:35 AM<BR>    Actions taken: Charge Capture section accepted

## 2017-08-31 NOTE — PROGRESS NOTES
Mara Live is a 43year old female.        Reason for Consult:        Mono/di Twins.  AMA.    Natural conception.     OB History:          S:  No problems or complaints.  She reports good FM, no regular contractions, no bleeding.      She pass

## 2017-08-31 NOTE — ADDENDUM NOTE
Encounter addended by: Manolo Lawson on: 8/31/2017 10:37 AM<BR>    Actions taken: Charge Capture section accepted

## 2017-09-02 ENCOUNTER — HOSPITAL ENCOUNTER (OUTPATIENT)
Dept: ULTRASOUND IMAGING | Facility: HOSPITAL | Age: 37
Discharge: HOME | End: 2017-09-02
Attending: OBSTETRICS & GYNECOLOGY
Payer: COMMERCIAL

## 2017-09-02 ENCOUNTER — ROUTINE PRENATAL (OUTPATIENT)
Dept: OBGYN CLINIC | Facility: CLINIC | Age: 37
End: 2017-09-02

## 2017-09-02 ENCOUNTER — APPOINTMENT (OUTPATIENT)
Dept: LAB | Facility: HOSPITAL | Age: 37
End: 2017-09-02
Attending: OBSTETRICS & GYNECOLOGY
Payer: COMMERCIAL

## 2017-09-02 VITALS — BODY MASS INDEX: 37 KG/M2 | DIASTOLIC BLOOD PRESSURE: 76 MMHG | WEIGHT: 237 LBS | SYSTOLIC BLOOD PRESSURE: 114 MMHG

## 2017-09-02 DIAGNOSIS — M79.661 PAIN AND SWELLING OF RIGHT LOWER LEG: ICD-10-CM

## 2017-09-02 DIAGNOSIS — O30.039 MONOCHORIONIC DIAMNIOTIC TWIN PREGNANCY, ANTEPARTUM: Primary | ICD-10-CM

## 2017-09-02 DIAGNOSIS — Z34.83 ENCOUNTER FOR SUPERVISION OF OTHER NORMAL PREGNANCY IN THIRD TRIMESTER: ICD-10-CM

## 2017-09-02 DIAGNOSIS — M79.89 PAIN AND SWELLING OF RIGHT LOWER LEG: ICD-10-CM

## 2017-09-02 LAB
APPEARANCE: CLEAR
BILIRUBIN: NEGATIVE
ERYTHROCYTE [DISTWIDTH] IN BLOOD BY AUTOMATED COUNT: 14 % (ref 11–15)
GLUCOSE (URINE DIPSTICK): NEGATIVE MG/DL
HCT VFR BLD AUTO: 36.4 % (ref 35–48)
HGB BLD-MCNC: 12.1 G/DL (ref 12–16)
KETONES (URINE DIPSTICK): NEGATIVE MG/DL
LEUKOCYTES: NEGATIVE
MCH RBC QN AUTO: 29.3 PG (ref 27–32)
MCHC RBC AUTO-ENTMCNC: 33.3 G/DL (ref 32–37)
MCV RBC AUTO: 88 FL (ref 80–100)
MULTISTIX LOT#: NORMAL NUMERIC
NITRITE, URINE: NEGATIVE
OCCULT BLOOD: NEGATIVE
PH, URINE: 6 (ref 4.5–8)
PLATELET # BLD AUTO: 110 K/UL (ref 140–400)
PMV BLD AUTO: 11.5 FL (ref 7.4–10.3)
PROTEIN (URINE DIPSTICK): NEGATIVE MG/DL
RBC # BLD AUTO: 4.13 M/UL (ref 3.7–5.4)
SPECIFIC GRAVITY: 1.01 (ref 1–1.03)
URINE-COLOR: YELLOW
UROBILINOGEN,SEMI-QN: 0 MG/DL (ref 0–1.9)
WBC # BLD AUTO: 8.1 K/UL (ref 4–11)

## 2017-09-02 PROCEDURE — 93971 EXTREMITY STUDY: CPT | Performed by: OBSTETRICS & GYNECOLOGY

## 2017-09-02 PROCEDURE — 86780 TREPONEMA PALLIDUM: CPT

## 2017-09-02 PROCEDURE — 36415 COLL VENOUS BLD VENIPUNCTURE: CPT

## 2017-09-02 PROCEDURE — 85027 COMPLETE CBC AUTOMATED: CPT

## 2017-09-02 NOTE — PROGRESS NOTES
[facility], [facility]  Obstetrics and Gynecology  Prenatal Visit  Tiara Lawson MD    LON Weathers is a 43year old.o. [id number] 36w0d weeks.   Patient with occasional contractions but no regular contractions, spontaneous rupture membranes or vaginal b for supervision of other normal pregnancy in third trimester  Plan: URINALYSIS NONAUTO W/O SCOPE    (M79.661,  M79.89) Pain and swelling of right lower leg  Plan: US VENOUS DOPPLER LEG RIGHT - DIAG IMG         (OPC=11614)    Plan   Patient to go for Dopple

## 2017-09-05 ENCOUNTER — APPOINTMENT (OUTPATIENT)
Dept: OBGYN CLINIC | Facility: HOSPITAL | Age: 37
End: 2017-09-05
Payer: COMMERCIAL

## 2017-09-05 ENCOUNTER — HOSPITAL ENCOUNTER (OUTPATIENT)
Facility: HOSPITAL | Age: 37
Discharge: HOME | End: 2017-09-05
Attending: OBSTETRICS & GYNECOLOGY | Admitting: OBSTETRICS & GYNECOLOGY
Payer: COMMERCIAL

## 2017-09-05 VITALS — DIASTOLIC BLOOD PRESSURE: 72 MMHG | SYSTOLIC BLOOD PRESSURE: 112 MMHG | HEART RATE: 90 BPM

## 2017-09-05 LAB — T PALLIDUM AB SER QL: NEGATIVE

## 2017-09-05 NOTE — NST
Nonstress Test   Patient: Dusty Turner    Gestation: 36w3d    NST: Twins, AMA, High BMI       Variability: Moderate    Variability - Fetus B: Moderate      Accelerations: Yes    Accelerations -  Fetus B: Yes      Decelerations: None     Decelerations

## 2017-09-08 ENCOUNTER — HOSPITAL ENCOUNTER (INPATIENT)
Dept: OBGYN CLINIC | Facility: HOSPITAL | Age: 37
Discharge: HOME | End: 2017-09-08
Attending: OBSTETRICS & GYNECOLOGY
Payer: COMMERCIAL

## 2017-09-08 ENCOUNTER — ANESTHESIA (OUTPATIENT)
Dept: OBGYN CLINIC | Facility: HOSPITAL | Age: 37
End: 2017-09-08
Payer: COMMERCIAL

## 2017-09-08 ENCOUNTER — HOSPITAL ENCOUNTER (INPATIENT)
Facility: HOSPITAL | Age: 37
LOS: 2 days | Discharge: HOME | End: 2017-09-10
Attending: OBSTETRICS & GYNECOLOGY | Admitting: OBSTETRICS & GYNECOLOGY
Payer: COMMERCIAL

## 2017-09-08 PROBLEM — O30.009 TWIN PREGNANCY: Status: ACTIVE | Noted: 2017-09-08

## 2017-09-08 LAB
ANTIBODY SCREEN: NEGATIVE
BASOPHILS # BLD: 0 K/UL (ref 0–0.2)
BASOPHILS NFR BLD: 0 %
EOSINOPHIL # BLD: 0.1 K/UL (ref 0–0.7)
EOSINOPHIL NFR BLD: 1 %
ERYTHROCYTE [DISTWIDTH] IN BLOOD BY AUTOMATED COUNT: 14.6 % (ref 11–15)
HCT VFR BLD AUTO: 38.4 % (ref 35–48)
HGB BLD-MCNC: 12.5 G/DL (ref 12–16)
LYMPHOCYTES # BLD: 1.6 K/UL (ref 1–4)
LYMPHOCYTES NFR BLD: 18 %
MCH RBC QN AUTO: 28.8 PG (ref 27–32)
MCHC RBC AUTO-ENTMCNC: 32.7 G/DL (ref 32–37)
MCV RBC AUTO: 88.2 FL (ref 80–100)
MONOCYTES # BLD: 0.6 K/UL (ref 0–1)
MONOCYTES NFR BLD: 7 %
NEUTROPHILS # BLD AUTO: 6.9 K/UL (ref 1.8–7.7)
NEUTROPHILS NFR BLD: 74 %
PLATELET # BLD AUTO: 127 K/UL (ref 140–400)
PMV BLD AUTO: 11.9 FL (ref 7.4–10.3)
RBC # BLD AUTO: 4.35 M/UL (ref 3.7–5.4)
RH BLOOD TYPE: POSITIVE
WBC # BLD AUTO: 9.3 K/UL (ref 4–11)

## 2017-09-08 PROCEDURE — 3E033VJ INTRODUCTION OF OTHER HORMONE INTO PERIPHERAL VEIN, PERCUTANEOUS APPROACH: ICD-10-PCS | Performed by: OBSTETRICS & GYNECOLOGY

## 2017-09-08 PROCEDURE — 10907ZC DRAINAGE OF AMNIOTIC FLUID, THERAPEUTIC FROM PRODUCTS OF CONCEPTION, VIA NATURAL OR ARTIFICIAL OPENING: ICD-10-PCS | Performed by: OBSTETRICS & GYNECOLOGY

## 2017-09-08 RX ORDER — TERBUTALINE SULFATE 1 MG/ML
0.25 INJECTION, SOLUTION SUBCUTANEOUS AS NEEDED
Status: DISCONTINUED | OUTPATIENT
Start: 2017-09-08 | End: 2017-09-09 | Stop reason: HOSPADM

## 2017-09-08 RX ORDER — AMMONIA INHALANTS 0.04 G/.3ML
0.3 INHALANT RESPIRATORY (INHALATION) AS NEEDED
Status: DISCONTINUED | OUTPATIENT
Start: 2017-09-08 | End: 2017-09-09 | Stop reason: HOSPADM

## 2017-09-08 RX ORDER — MISOPROSTOL 200 UG/1
TABLET ORAL
Status: DISCONTINUED
Start: 2017-09-08 | End: 2017-09-09 | Stop reason: WASHOUT

## 2017-09-08 RX ORDER — METHYLERGONOVINE MALEATE 0.2 MG/ML
INJECTION INTRAVENOUS
Status: DISCONTINUED
Start: 2017-09-08 | End: 2017-09-08 | Stop reason: WASHOUT

## 2017-09-08 RX ORDER — TRISODIUM CITRATE DIHYDRATE AND CITRIC ACID MONOHYDRATE 500; 334 MG/5ML; MG/5ML
30 SOLUTION ORAL AS NEEDED
Status: DISCONTINUED | OUTPATIENT
Start: 2017-09-08 | End: 2017-09-09 | Stop reason: HOSPADM

## 2017-09-08 RX ORDER — IBUPROFEN 600 MG/1
600 TABLET ORAL ONCE AS NEEDED
Status: DISCONTINUED | OUTPATIENT
Start: 2017-09-08 | End: 2017-09-09 | Stop reason: HOSPADM

## 2017-09-08 RX ORDER — SODIUM CHLORIDE 0.9 % (FLUSH) 0.9 %
10 SYRINGE (ML) INJECTION AS NEEDED
Status: DISCONTINUED | OUTPATIENT
Start: 2017-09-08 | End: 2017-09-09 | Stop reason: HOSPADM

## 2017-09-08 RX ORDER — LIDOCAINE HYDROCHLORIDE 10 MG/ML
30 INJECTION, SOLUTION EPIDURAL; INFILTRATION; INTRACAUDAL; PERINEURAL ONCE
Status: DISCONTINUED | OUTPATIENT
Start: 2017-09-08 | End: 2017-09-09 | Stop reason: HOSPADM

## 2017-09-08 RX ORDER — CARBOPROST TROMETHAMINE 250 UG/ML
INJECTION, SOLUTION INTRAMUSCULAR
Status: DISCONTINUED
Start: 2017-09-08 | End: 2017-09-08 | Stop reason: WASHOUT

## 2017-09-08 RX ORDER — SODIUM CHLORIDE, SODIUM LACTATE, POTASSIUM CHLORIDE, CALCIUM CHLORIDE 600; 310; 30; 20 MG/100ML; MG/100ML; MG/100ML; MG/100ML
INJECTION, SOLUTION INTRAVENOUS
Status: COMPLETED
Start: 2017-09-08 | End: 2017-09-08

## 2017-09-08 RX ORDER — LIDOCAINE HYDROCHLORIDE AND EPINEPHRINE 20; 5 MG/ML; UG/ML
INJECTION, SOLUTION EPIDURAL; INFILTRATION; INTRACAUDAL; PERINEURAL
Status: DISCONTINUED
Start: 2017-09-08 | End: 2017-09-09 | Stop reason: WASHOUT

## 2017-09-08 RX ORDER — BUPIVACAINE HYDROCHLORIDE 2.5 MG/ML
INJECTION, SOLUTION EPIDURAL; INFILTRATION; INTRACAUDAL
Status: DISCONTINUED
Start: 2017-09-08 | End: 2017-09-08 | Stop reason: WASHOUT

## 2017-09-08 RX ORDER — EPHEDRINE SULFATE/0.9% NACL/PF 25 MG/5 ML
SYRINGE (ML) INTRAVENOUS
Status: DISCONTINUED
Start: 2017-09-08 | End: 2017-09-08 | Stop reason: WASHOUT

## 2017-09-08 RX ORDER — 0.9 % SODIUM CHLORIDE 0.9 %
VIAL (ML) INJECTION
Status: DISPENSED
Start: 2017-09-08 | End: 2017-09-08

## 2017-09-08 RX ORDER — PHENYLEPHRINE HCL IN 0.9% NACL 0.5 MG/5ML
SYRINGE (ML) INTRAVENOUS
Status: DISCONTINUED
Start: 2017-09-08 | End: 2017-09-09 | Stop reason: WASHOUT

## 2017-09-08 RX ORDER — DEXTROSE, SODIUM CHLORIDE, SODIUM LACTATE, POTASSIUM CHLORIDE, AND CALCIUM CHLORIDE 5; .6; .31; .03; .02 G/100ML; G/100ML; G/100ML; G/100ML; G/100ML
125 INJECTION, SOLUTION INTRAVENOUS CONTINUOUS
Status: DISCONTINUED | OUTPATIENT
Start: 2017-09-08 | End: 2017-09-09 | Stop reason: HOSPADM

## 2017-09-08 RX ADMIN — SODIUM CHLORIDE, SODIUM LACTATE, POTASSIUM CHLORIDE, CALCIUM CHLORIDE: 600; 310; 30; 20 INJECTION, SOLUTION INTRAVENOUS at 23:51:00

## 2017-09-08 NOTE — H&P
27 Maribel Salinas Patient Status:  Inpatient    10/17/1980 MRN [id number]   Location [address]  Attending Charisse Gleason MD   Hosp Day # 0 PCP Adalberto Shah     Date of Ad Test Value Reference Range Date Time    TSH 2.40 uIU/mL 0.45 - 5.33 uIU/mL 04/27/17 1854    HCV        Pap Smear        GC DNA        Chlamydia DNA        GTT 1 Hr 128 mg/dL   mg/dL 04/27/17 1854    Glucose Fasting        Glucose 1 Hr        Glucose 2 H answer)        Cystic Fibrosis[165] (Required questions in OE to answer)        Cystic Fibrosis[165] (Required questions in OE to answer)        Sickle Cell Negative  Negative 04/27/17 1854    24Hr Urine Protein        24Hr Urine Creatinine        Parvo B1 Father 62     Type II   • Cancer Maternal Grandmother      lung-nonsmoker   • Thyroid Disorder Sister      Hyperthyroidism     Social History:    Smoking status: Former Smoker  For 5.00 Years     Types: Cigarettes    Smokeless tobacco: Never Used    Alcoho HGB 12.5 09/08/2017   HCT 38.4 09/08/2017    (L) 09/08/2017   TSH 2.40 04/27/2017         Lab Results  Component Value Date   SPECGRAVITY 1.010 09/02/2017   GLUUR NEGATIVE 09/02/2017   NITRITE NEGATIVE 09/02/2017       Assessment/Plan:    [facility]

## 2017-09-08 NOTE — PROGRESS NOTES
[facility]    Labor Progress Note    Adenike Martinez Patient Status:  Inpatient    10/17/1980 MRN [id number]   Location [address]  Attending Annika Joseph MD   Hosp Day # 0 PCP Meryl Eldridge labor. Patient on Pitocin per protocol. Continue Pitocin. Anticipate normal spontaneous vaginal delivery.     Severa Dixons, MD  9/8/2017  6:19 PM

## 2017-09-09 ENCOUNTER — ANESTHESIA EVENT (OUTPATIENT)
Dept: OBGYN CLINIC | Facility: HOSPITAL | Age: 37
End: 2017-09-09
Payer: COMMERCIAL

## 2017-09-09 LAB
BASOPHILS # BLD: 0 K/UL (ref 0–0.2)
BASOPHILS NFR BLD: 0 %
EOSINOPHIL # BLD: 0.1 K/UL (ref 0–0.7)
EOSINOPHIL NFR BLD: 1 %
ERYTHROCYTE [DISTWIDTH] IN BLOOD BY AUTOMATED COUNT: 14.4 % (ref 11–15)
HCT VFR BLD AUTO: 36.8 % (ref 35–48)
HGB BLD-MCNC: 12.1 G/DL (ref 12–16)
LYMPHOCYTES # BLD: 1.3 K/UL (ref 1–4)
LYMPHOCYTES NFR BLD: 9 %
MCH RBC QN AUTO: 29 PG (ref 27–32)
MCHC RBC AUTO-ENTMCNC: 32.9 G/DL (ref 32–37)
MCV RBC AUTO: 88 FL (ref 80–100)
MONOCYTES # BLD: 1 K/UL (ref 0–1)
MONOCYTES NFR BLD: 7 %
NEUTROPHILS # BLD AUTO: 11.9 K/UL (ref 1.8–7.7)
NEUTROPHILS NFR BLD: 83 %
PLATELET # BLD AUTO: 109 K/UL (ref 140–400)
PMV BLD AUTO: 10.6 FL (ref 7.4–10.3)
RBC # BLD AUTO: 4.18 M/UL (ref 3.7–5.4)
WBC # BLD AUTO: 14.3 K/UL (ref 4–11)

## 2017-09-09 PROCEDURE — 59409 OBSTETRICAL CARE: CPT | Performed by: OBSTETRICS & GYNECOLOGY

## 2017-09-09 PROCEDURE — 59400 OBSTETRICAL CARE: CPT | Performed by: OBSTETRICS & GYNECOLOGY

## 2017-09-09 RX ORDER — IBUPROFEN 600 MG/1
600 TABLET ORAL EVERY 4 HOURS PRN
Status: DISCONTINUED | OUTPATIENT
Start: 2017-09-09 | End: 2017-09-10

## 2017-09-09 RX ORDER — DIAPER,BRIEF,INFANT-TODD,DISP
1 EACH MISCELLANEOUS EVERY 6 HOURS PRN
Status: DISCONTINUED | OUTPATIENT
Start: 2017-09-09 | End: 2017-09-10

## 2017-09-09 RX ORDER — SIMETHICONE 80 MG
80 TABLET,CHEWABLE ORAL 3 TIMES DAILY PRN
Status: DISCONTINUED | OUTPATIENT
Start: 2017-09-09 | End: 2017-09-10

## 2017-09-09 RX ORDER — IBUPROFEN 400 MG/1
400 TABLET ORAL EVERY 4 HOURS PRN
Status: DISCONTINUED | OUTPATIENT
Start: 2017-09-09 | End: 2017-09-10

## 2017-09-09 RX ORDER — BISACODYL 10 MG
10 SUPPOSITORY, RECTAL RECTAL ONCE AS NEEDED
Status: ACTIVE | OUTPATIENT
Start: 2017-09-09 | End: 2017-09-09

## 2017-09-09 RX ORDER — SODIUM CHLORIDE, SODIUM LACTATE, POTASSIUM CHLORIDE, CALCIUM CHLORIDE 600; 310; 30; 20 MG/100ML; MG/100ML; MG/100ML; MG/100ML
INJECTION, SOLUTION INTRAVENOUS CONTINUOUS PRN
Status: DISCONTINUED | OUTPATIENT
Start: 2017-09-08 | End: 2017-09-09 | Stop reason: SURG

## 2017-09-09 RX ORDER — IBUPROFEN 400 MG/1
200 TABLET ORAL EVERY 4 HOURS PRN
Status: DISCONTINUED | OUTPATIENT
Start: 2017-09-09 | End: 2017-09-10

## 2017-09-09 RX ORDER — ONDANSETRON 2 MG/ML
4 INJECTION INTRAMUSCULAR; INTRAVENOUS EVERY 6 HOURS PRN
Status: DISCONTINUED | OUTPATIENT
Start: 2017-09-09 | End: 2017-09-10

## 2017-09-09 RX ORDER — AMMONIA INHALANTS 0.04 G/.3ML
0.3 INHALANT RESPIRATORY (INHALATION) AS NEEDED
Status: DISCONTINUED | OUTPATIENT
Start: 2017-09-09 | End: 2017-09-10

## 2017-09-09 RX ORDER — PRENATAL VIT,CAL 76/IRON/FOLIC 29 MG-1 MG
1 TABLET ORAL DAILY
Status: DISCONTINUED | OUTPATIENT
Start: 2017-09-09 | End: 2017-09-10

## 2017-09-09 RX ORDER — SODIUM CHLORIDE 0.9 % (FLUSH) 0.9 %
10 SYRINGE (ML) INJECTION AS NEEDED
Status: DISCONTINUED | OUTPATIENT
Start: 2017-09-09 | End: 2017-09-10

## 2017-09-09 RX ORDER — DOCUSATE SODIUM 100 MG/1
100 CAPSULE, LIQUID FILLED ORAL 2 TIMES DAILY
Status: DISCONTINUED | OUTPATIENT
Start: 2017-09-09 | End: 2017-09-10

## 2017-09-09 RX ADMIN — SODIUM CHLORIDE, SODIUM LACTATE, POTASSIUM CHLORIDE, CALCIUM CHLORIDE: 600; 310; 30; 20 INJECTION, SOLUTION INTRAVENOUS at 00:10:00

## 2017-09-09 NOTE — PROGRESS NOTES
[facility]    Post Partum Progress Note    Krissysupa Gerards Patient Status:  Inpatient    10/17/1980 MRN [id number]   Location [facility] 3SE Attending Vinod Montenegro MD   Hosp Day # 1 PCP [address] consultant to see patient  Plan for discharge home tomorrow    Discussed with:  nurse     patient     Plan discussed with patient who verbalizes understanding and agreement.     Eleuterio Austin MD  9/9/2017  8:38 AM

## 2017-09-09 NOTE — PLAN OF CARE
BIRTH - VAGINAL/ SECTION    • Fetal and maternal status remain reassuring during the birth process Progressing        PAIN - ADULT    • Verbalizes/displays adequate comfort level or patient's stated pain goal Progressing        Patient/Family Goals

## 2017-09-09 NOTE — L&D DELIVERY NOTE
[facility] HOSP - [facility]    Vaginal Delivery Note    Sohail Jama Patient Status:  Inpatient    10/17/1980 MRN [id number]   Location [unfilled] Attending Peggy Gasca MD   Hosp Day # 1 PCP Fred Peal     Delivery     Infant  Date of Delive monochorionic appearing blood placenta  Placenta to Pathology: no  Cord Gases Submitted: no  Cord Blood/Tissue Collection: yes  Cord Complications: none  Sponge and Needle Counts:  Verified    Maternal Anesthesia: none   Episiotomy/Laceration Repair  Episi

## 2017-09-09 NOTE — ANESTHESIA PREPROCEDURE EVALUATION
Anesthesia PreOp Note    HPI:     Cassi Anand is a 43year old female who presents for preoperative consultation requested by: * No surgeons listed *    Date of Surgery: 9/8/2017    * No procedures listed *  Indication: * No pre-op diagnosis entered Facility-Administered Medications Ordered in Epic:  lactated ringers infusion  Intravenous Continuous PRN Johnson Lang DO     Normal Saline Flush 0.9 % injection 10 mL 10 mL Intravenous PRN Mehnaz Bernabe MD     Normal Saline Flush 0.9 % injection 10 status: Former Smoker  For 5.00 Years     Types: Cigarettes    Smokeless tobacco: Never Used    Alcohol use Yes  4.2 oz/week    7 Glasses of wine per week         Drug use: No    Sexual activity: Not on file     Other Topics Concern    Blood Transfusions N the patient's questions were answered to the best of my ability. The patient desires the anesthetic management as planned.   Maria Cook  9/9/2017 12:07 AM

## 2017-09-09 NOTE — PROGRESS NOTES
PT TRANSFERRED TO PP UNIT VIA WHEELCHAIR,  TWIN 1 AND  TWIN 2 TRANSFERRED VIA OPEN CRIB ALONGSIDE MOTHER, MOTHER AND BABIES IN STABLE CONDITION, SIGNIFICANT OTHER PRESENT, ORIENTED TO ENVT, CALL LIGHT PLACED WITHIN REACH, PT ENCOURAGED TO LARISSA

## 2017-09-09 NOTE — PROGRESS NOTES
[facility]    Labor Progress Note    John Blair Patient Status:  Inpatient    10/17/1980 MRN [id number]   Location [facility] Attending Néstor Farah MD   Hosp Day # 0 PCP Celso Eldridge management  Anticipate normal spontaneous vaginal delivery ×2    Janett Gomez MD  9/8/2017  11:25 PM

## 2017-09-09 NOTE — ANESTHESIA POSTPROCEDURE EVALUATION
Patient: Mara Live    Procedure Summary     Date:  09/08/17 Room / Location:  [facility]    Anesthesia Start:  7326 Anesthesia Stop:  09/09/17 0021    Procedure:  FBC INDUCTION Diagnosis:      Scheduled Providers:   Syed Vines

## 2017-09-10 VITALS
WEIGHT: 237 LBS | RESPIRATION RATE: 16 BRPM | TEMPERATURE: 100 F | SYSTOLIC BLOOD PRESSURE: 136 MMHG | BODY MASS INDEX: 39.49 KG/M2 | HEIGHT: 65 IN | DIASTOLIC BLOOD PRESSURE: 76 MMHG | HEART RATE: 73 BPM

## 2017-09-10 RX ORDER — IBUPROFEN 600 MG/1
600 TABLET ORAL EVERY 4 HOURS PRN
Qty: 30 TABLET | Refills: 0 | Status: SHIPPED | OUTPATIENT
Start: 2017-09-10

## 2017-09-10 NOTE — LACTATION NOTE
This note was copied from a baby's chart.   LACTATION NOTE - INFANT    Evaluation Type  Evaluation Type: Inpatient    Problems & Assessment  Problems: comment/detail: LPT  Infant Assessment: Skin color: pink or appropriate for ethnicity  Muscle tone: Approp

## 2017-09-10 NOTE — PLAN OF CARE
Problem: BREAST FEEDING  Goal: Optimize infant feeding at the breast  INTERVENTIONS:  - Initiate breast feeding within first hour after birth. - Monitor effectiveness of current breast feeding efforts.   - Assess support systems available to mother/family Maximize feeding opportunity (skin to skin, behavioral state)  -  Positioning techniques  -  Discourage use of pacifier-artificial nipple  -  Educate mother on feeding cues   Outcome: Adequate for Discharge  Both twins are nursing well, minor sore nipples,

## 2017-09-10 NOTE — LACTATION NOTE
LACTATION NOTE - MOTHER      Evaluation Type: Inpatient    Problems identified  Problems identified: Knowledge deficit;Multiple birth    Maternal history  Maternal history: AMA;Obesity    Breastfeeding goal  Breastfeeding goal: To maintain breast milk feed

## 2017-09-10 NOTE — DISCHARGE SUMMARY
[facility]    Discharge Summary    [facility] Patient Status:  Inpatient    10/17/1980 MRN [id number]   Location [facility] 3SE Attending Toma Lincoln MD   Hosp Day # 2 PCP Liana Sauceda     Date of Admission: 2017 Chadwick Escobar  9/10/2017

## 2017-10-18 ENCOUNTER — POSTPARTUM (OUTPATIENT)
Dept: OBGYN CLINIC | Facility: CLINIC | Age: 37
End: 2017-10-18

## 2017-10-18 VITALS
DIASTOLIC BLOOD PRESSURE: 60 MMHG | WEIGHT: 202 LBS | BODY MASS INDEX: 32.08 KG/M2 | SYSTOLIC BLOOD PRESSURE: 96 MMHG | HEIGHT: 66.5 IN

## 2017-10-18 PROBLEM — Z34.90 PREGNANCY: Status: RESOLVED | Noted: 2017-08-01 | Resolved: 2017-10-18

## 2017-10-18 PROBLEM — O30.009 TWIN PREGNANCY: Status: RESOLVED | Noted: 2017-09-08 | Resolved: 2017-10-18

## 2017-10-18 PROBLEM — O09.529 AMA (ADVANCED MATERNAL AGE) MULTIGRAVIDA 35+: Status: RESOLVED | Noted: 2017-02-23 | Resolved: 2017-10-18

## 2017-10-18 PROBLEM — O30.043 DICHORIONIC DIAMNIOTIC TWIN PREGNANCY IN THIRD TRIMESTER: Status: RESOLVED | Noted: 2017-02-27 | Resolved: 2017-10-18

## 2017-10-18 PROBLEM — O30.039 MONOCHORIONIC DIAMNIOTIC TWIN PREGNANCY: Status: RESOLVED | Noted: 2017-03-21 | Resolved: 2017-10-18

## 2017-10-18 PROBLEM — O60.00 PRETERM LABOR: Status: RESOLVED | Noted: 2017-08-01 | Resolved: 2017-10-18

## 2017-10-18 NOTE — PROGRESS NOTES
HPI    Hanna Beverly is a 42year old female [id number] here for 6 week post-partum visit. Patient delivered twin female infants on 2017 via .     Pt states she is voiding freely, tolerating general diet, having normal bowel movements and [[id number]]      6 lb 13.9 oz (3.115 kg)     Information for the patient's : Robles Kebede Girl 2 [[id number]]      6 lb 5.8 oz (2.885 kg)      Presentation   Robles Kebede Girl 1 [[id number]]      Vertex [1]     Bellajolynn Eric, Girl 2 [[id number]]      [facility] N NOE      Complications: Group B beta strep +   4 TAB 01/01/10           3 Term 05/13/01 40w0d  8 lb 3 oz (3.714 kg) M NORMAL SPONT EPI N NOE   2 Term 10/09/99 40w4d  7 lb 10 oz (3.459 kg) F NORMAL SPONT EPI N NOE      Complications: Fetal bradycardia   1

## 2017-12-14 ENCOUNTER — TELEPHONE (OUTPATIENT)
Dept: OBGYN CLINIC | Facility: CLINIC | Age: 37
End: 2017-12-14

## 2017-12-14 NOTE — TELEPHONE ENCOUNTER
Per pt states a few months ago SM wrote a prescription for a breast pump. Pt states the insurance will be faxing over a form that states what the medical necessity for hospital grade breast pump and pt states because she has twins.  Pt wanted to give a head

## 2023-03-30 NOTE — PROGRESS NOTES
[facility]    Labor Progress Note    Arie Horn Patient Status:  Inpatient    10/17/1980 MRN [id number]   Location [address]  Attending Mahamed Villalobos MD   Hosp Day # 0 PCP [facility]       Subject Mynor Marquez MD  8/1/2017  10:10 AM Render In Strict Bullet Format?: No Initiate Treatment: 1. Apply triamcinolone 0.1% cream (steroid) 2x per day for 14 days\\n2. After 14 days, switch to Pimecrolimus cream (non-steroid) 2x per day as needed for mild eczema or maintenance \\n\\n-Overnight, coat hands in Vaseline and cover with cotton socks \\n-use super glue for any cracks/fissures\\n-Moisturizer multiple times per day, especially after hand washing Detail Level: Zone

## 2023-05-24 NOTE — TELEPHONE ENCOUNTER
Informed meals sent via [email or web address], Patient informed and verbalized understanding. Last Office Visit: 5/2/2023     Requested Prescriptions     Pending Prescriptions Disp Refills    omeprazole (PriLOSEC) 40 mg DR capsule [Pharmacy Med Name: OMEPRAZOLE DR CAPS 40MG] 90 capsule 3     Sig: TAKE 1 CAPSULE DAILY

## (undated) NOTE — IP AVS SNAPSHOT
Patient Demographics     Address Phone E-mail Address    [address] [facility]  [place] [address] [phone] (Home) *Preferred*  [phone] (Work)  [phone] (Mobile) [email or web address]      Emergency Contact(s)     Name Relation Home Work [employer] SLP Notes     No notes of this type exist for this encounter.             Future Appointments        Provider Nancy Solis    4/7/2017 10:20 AM Richard Zhang MD [facility], [facility], [address] MOB    4/12/2017 11:30 AM [facility] ROBBIE

## (undated) NOTE — Clinical Note
Weekly NST  Monthly growth US  TTTS checks every 2 wks with Dopplers MCA Dopplers  Plan delivery at 36-37 weeks for monochorionic placenta.  Earlier delivery if indication arises.

## (undated) NOTE — IP AVS SNAPSHOT
[address]  [address] [facility] [phone]                Discharge Summary   5/12/2017    Marquez Saini           Admission Information        Provider Department    5/12/2017 Jluis Tafoya MD [facility] Mate Office Visit with [address] [facility],First Floor Maternal Fetal Medicine ([address])    Nancy 78  [address] [address]   [phone]            Jul 07, 2017  2:00 PM   Office Visit with [address] [place] [address] you have any questions related to insurance coverage. Thank you. Questions:      Is this a stat exam?:        Immunization History as of 5/12/2017  Never Reviewed    No immunizations on file.       Recent Hematology Lab Results  (Last 3 results in the p You can access your MyChart to more actively manage your health care and view more details from this visit by going to [email or web address].   If you've recently had a stay at the Hospital you can access your discharge instructions in [address] by mikel

## (undated) NOTE — MR AVS SNAPSHOT
[facility]  [address] [place] [address] [phone]               Thank you for choosing us for your health care visit with Tammy Youngblood MD.  We are glad to serve you and happy to provide you with this summary of your visit Reason for Today's Visit     Prenatal Care           Medical Issues Discussed Today     Other normal pregnancy, not first, second trimester    -  Primary      Instructions and Information about Your Health     None      Allergies as of May 30, 2017     Pen

## (undated) NOTE — IP AVS SNAPSHOT
Patient Demographics     Address Phone E-mail Address    [address] Andie Nicholson  [address] [place] [address] Road [phone] (Home) *Preferred*  [phone] (Work)  [phone] (Mobile) [email or web address]      Emergency Contact(s)     Name Relation Home Work [employer] No notes of this type exist for this encounter. SLP Notes     No notes of this type exist for this encounter.             Future Appointments        Provider Nancy Solis    6/15/2017 9:30 AM Nitin Salgado 370 Maternal Fetal Medicine

## (undated) NOTE — IP AVS SNAPSHOT
[address] [facility]  602 [facility] [phone]                Discharge Summary   6/5/2017    Yael Trammell           Admission Information        Provider Department    6/5/2017 Sarah Eason MD [facility] Guillermo Davis Office Visit with [address] [facility],First Floor Maternal Fetal Medicine ([address])    Nancy 78  [address] [address]   [phone]            Jul 07, 2017  2:00 PM   Office Visit with [address] [place] [address] [address]  [address] [place], [place]    [address]  [address] [address] [place], [place]    It is the patient's responsibility to check with and follow their insurance company's guidelines for prior authorization Patient [address] to help you get signed up for insurance coverage. Patient [address] is a Federal Navigator program that can help with your Affordable Care Act coverage, as well as all types of Medicaid plans.   To get signed up and covere What to report to your healthcare team: Pain, nausea/vomiting, no bowel movement in 2+ days, diarrhea           All Other Medications     Cod Liver Oil 1000 MG Oral Cap    Prenatal Vit-Fe Fumarate-FA (PRENATAL VITAMIN OR)

## (undated) NOTE — IP AVS SNAPSHOT
Patient Demographics     Address Phone E-mail Address    [address] Andie Nicholson  [address] [place] [address] Road [phone] (Home) *Preferred*  [phone] (Work)  [phone] (Mobile) [email or web address]      Emergency Contact(s)     Name Relation Home Work [employer] SLP Notes     No notes of this type exist for this encounter.             Future Appointments        Provider Nancy Solis    4/28/2017 2:00 PM Nitin Salgado [address] Maternal Fetal Medicine [facility]    5/5/2017 10:20 AM Mohan Elise MD

## (undated) NOTE — IP AVS SNAPSHOT
[address] [facility] [address]  602 [facility] [phone]                Discharge Summary   4/12/2017    Konstantin William           Admission Information        Provider Department    4/12/2017 Renetta Bear MD [facility] Rosie Helms Office Visit with [address] [facility],First Floor Maternal Fetal Medicine ([address])    [facility]  [address] [place] [address]   [phone]            May 26, 2017  2:30 PM   Office Visit with [address] [facility],First Floor Mat [address]  [address] [place], [place]    [address]  [address] [address] [place], [place]    It is the patient's responsibility to check with and follow their insurance company's guidelines for prior authorization Visits < Visit Summaries. MyChart questions? Call [phone] for help. MyChart is NOT to be used for urgent needs.   For medical emergencies, dial 911.             _____________________________________________________________________________    Hodan Lin

## (undated) NOTE — IP AVS SNAPSHOT
Patient Demographics     Address Phone E-mail Address    [address] Andie Nicholson  [address] [place] [address] Road [phone] (Home) *Preferred*  [phone] (Work)  [phone] (Mobile) [email or web address]      Emergency Contact(s)     Name Relation Home Work [employer] SLP Notes     No notes of this type exist for this encounter.             Future Appointments        Provider Nancy Solis    4/28/2017 2:00 PM Nitin Salgado [address] Maternal Fetal Medicine [facility]    5/5/2017 10:20 AM Shawna Peguero MD

## (undated) NOTE — IP AVS SNAPSHOT
Patient Demographics     Address Phone E-mail Address    [address] Andie Nicholson  [facility] [facility] [address] [phone] (Home) *Preferred*  [phone] (Work)  [phone] (Mobile) [email or web address]      Emergency Contact(s)     Name Relation Home Work [employer] SLP Notes     No notes of this type exist for this encounter.             Future Appointments        Provider Nancy Solis    6/28/2017 2:10 PM Michelle Newman MD [facility], [facility], [address], [place] [facility] [place]    6/29/2017 10:30 AM Zuleyma

## (undated) NOTE — IP AVS SNAPSHOT
[address] [facility]  602 [facility] [phone]                Discharge Summary   4/17/2017    [facility]           Admission Information        Provider Department    4/17/2017 Alena Lu MD [facility] Marissa Peter Office Visit with [address] [facility],First Floor Maternal Fetal Medicine ([address])    [facility] 78  [address] [address]   [phone]            May 26, 2017  2:30 PM   Office Visit with [address] [facility],First Floor Mat [address]  [address] [place], [place]    [address]Susan [address]  [address] [address] [place], [place]    It is the patient's responsibility to check with and follow their insurance company's guidelines for prior authorization Visits < Visit Summaries. MyChart questions? Call [phone] for help. MyChart is NOT to be used for urgent needs.   For medical emergencies, dial 911.             _____________________________________________________________________________    Idania Sneed

## (undated) NOTE — IP AVS SNAPSHOT
[address] [facility] [address]  602 [facility] ~ [phone]                Discharge Summary   4/28/2017    Johnny [facility]           Admission Information        Provider Department    4/28/2017 Giovanny Montenegro MD [facility] Mate Office Visit with [address] [facility],First Floor Maternal Fetal Medicine ([address])    Nancy 78  [address] [address] [phone]            Jun 09, 2017  1:00 PM   Office Visit with [address] [place] [address] Metabolic Lab Results  (Last result in the past 90 days)    HgbA1C Glucose BUN Creatinine Calcium Alkaline Phosph AST    (04/27/17)  5.4 -- -- -- -- -- --      Radiology Exams     None         Additional Information       We are concerned for your overall review your medications with you before you are discharged, and can provide you with additional printed information. Not all patients will experience these side effects or respond to medications the same.  Please call your provider or healthcare team if you

## (undated) NOTE — LETTER
10/18/2017              Yael Trammell        [facility] [address] [address]         To Whom It May Concern,    Yael Trammell delivered on September 8, 2017.  I believe she would benefit from being off of work for 12 weeks due to the [employer]

## (undated) NOTE — IP AVS SNAPSHOT
Patient Demographics     Address  [address] [place] [address] Phone  [phone] (Home) *Preferred*  [phone] (Work)  [phone] [facility]) E-mail Address  [email or web address]      Emergency Contact(s)     Name Relation Home Work [employer] Occupational Therapy Notes (last 72 hours) (Notes from 8/28/2017 10:10 AM through 8/31/2017 10:10 AM)     No notes of this type exist for this encounter. Video Swallow Study Notes     No notes of this type exist for this encounter.       SLP Notes

## (undated) NOTE — IP AVS SNAPSHOT
[facility]            (For Outpatient Use Only) Initial Admit Date: 7/7/2017   Inpt/Obs Admit Date: Inpt: N/A / Obs: N/A   Discharge Date:    Hospital Acct:  [de-identified]   MRN: [de-identified]   CSN: [id number]        ENCOUNTER  Patient Class: OUT

## (undated) NOTE — MR AVS SNAPSHOT
[facility]  [address] [place] [address] [phone]               Thank you for choosing us for your health care visit with Rigoberto Guzman.  Flaquita Booth MD.  We are glad to serve you and happy to provide you with this summary of your vis Penicillamine     Other reaction(s): PENICILLAMINE    Penicillins Hives    Other reaction(s): PENICILLINS                Today's Vital Signs     BP                   103/69 mmHg              Current Medications          This list is accurate as of: 4/17/1

## (undated) NOTE — MR AVS SNAPSHOT
[address]  [phone]               Thank you for choosing us for your health care visit with Karolyn Greene CNM.   We are glad to serve you and happy to provide you with this s [employer] will allow you to access patient instructions from your recent visit,  view other health information, and more. To sign up or find more information, go to [email or web address] and click on the Sign Up Now link in the [employer] box.      Enter 2 ½ hours per week – spread out over time Use a hilaria to keep you motivated   Don’t forget strength training with weights and resistance Set goals and track your progress   You don’t need to join a gym. Home exercises work great.  Put more priority on exe

## (undated) NOTE — Clinical Note
Weekly NST at 32wks Monthly growth US  TTTS checks every 2 wks with Dopplers MCA Dopplers  Plan delivery at 36-37 weeks for monochorionic placenta.  Earlier delivery if indication arises.

## (undated) NOTE — Clinical Note
Weekly NST at 32wks Level 2 US at 20wks Monthly growth US Fetal echocardiogram at 24wks MCA dopplers starting at 26wks TTTS checks every 2 wks with dopplers

## (undated) NOTE — IP AVS SNAPSHOT
Patient Demographics     Address Phone E-mail Address    [address] Andie Nicholson  [address] [place] [address] Road [phone] (Home) *Preferred*  [phone] (Work)  [phone] (Mobile) [email or web address]      Emergency Contact(s)     Name Relation Home Work [employer] SLP Notes     No notes of this type exist for this encounter.             Future Appointments        Provider Nancy Solis    5/4/2017 10:10 AM Barney Casey MD [facility] [place], [facility], [address] [place] [address] NAKITA    5/12/2017 11:00 AM [facility]

## (undated) NOTE — IP AVS SNAPSHOT
Patient Demographics     Address  [address] [place] [address] Phone  [phone] (Home) *Preferred*  [phone] (Work)  [phone] [facility]) E-mail Address  [email or web address]      Emergency Contact(s)     Name Relation Home Work [employer] No notes of this type exist for this encounter. Video Swallow Study Notes     No notes of this type exist for this encounter. SLP Notes     No notes of this type exist for this encounter.             Future Appointments        Provider Department

## (undated) NOTE — IP AVS SNAPSHOT
[address]  602 [facility] ~ [phone]                Discharge Summary   [facility]/15/2017    [facility]           Admission Information        Provider Department    6/15/2017 Antoine Philip MD [facility] Office Visit with [address] [place] [address],First Floor Maternal Fetal Medicine ([address])    [facility]  [address] [place], [place] [address]   [phone]            Jul 20, 2017 11:00 AM   Office Visit with [address] [facility],First Floor Mat insurance company's guidelines for prior authorization for this test.  You may be held responsible for payment in full if proper authorization is not acquired.   Please contact the Patient Business Office at [phone] if you have any questions related to Medicaid plans. To get signed up and covered, please call [phone] and ask to get set up for an insurance coverage that is in-network with Bobby Motley.         MyChart     Visit [employer]  You can access your MyChart to more actively manage Prenatal Vit-Fe Fumarate-FA (PRENATAL VITAMIN OR)

## (undated) NOTE — Clinical Note
Weekly NST at 32wks Level 2 US at 20wks Monthly growth US Fetal echocardiogram at 24wks Cervical length in 4wks TTTS checks every 2 wks starting at 16wks with dopplers MCA dopplers starting at 26wks

## (undated) NOTE — IP AVS SNAPSHOT
Patient Demographics     Address  [address] [place], [place] [id number] Phone  [phone] (Home) *Preferred*  [phone] (Work)  [phone] [facility]) E-mail Address  [email or web address]      Emergency Contact(s)     Name Relation Home Work [employer] No notes of this type exist for this encounter.             Future Appointments        Provider Nancy Solis    7/19/2017 8:30 AM SHANNAN Kinney [address] Richie Russell    7/20/2017 11:00 AM [address] [place] [address]

## (undated) NOTE — IP AVS SNAPSHOT
Patient Demographics     Address Phone E-mail Address    [address] Andie Nicholson  [address] [place] [address] Road [phone] (Home) *Preferred*  [phone] (Work)  [phone] (Mobile) [email or web address]      Emergency Contact(s)     Name Relation Home Work [employer] SLP Notes     No notes of this type exist for this encounter.             Future Appointments        Provider Nancy Solis    5/26/2017 2:30 PM Nitin Salgado [address] [facility]    5/30/2017 10:00 AM Vishnu Schilling,

## (undated) NOTE — IP AVS SNAPSHOT
Patient Demographics     Address Phone E-mail Address    [address] Andie Nicholson  [address] [phone] (Home) *Preferred*  [phone] (Work)  [phone] (Mobile) [email or web address]      Emergency Contact(s)     Name Relation Home Work [employer] No notes of this type exist for this encounter. Video Swallow Study Notes     No notes of this type exist for this encounter. SLP Notes     No notes of this type exist for this encounter.             Future Appointments        Provider Department

## (undated) NOTE — IP AVS SNAPSHOT
[facility]            (For Outpatient Use Only) Initial Admit Date: 6/29/2017   Inpt/Obs Admit Date: Inpt: N/A / Obs: N/A   Discharge Date:    Hospital Acct:  [de-identified]   MRN: [de-identified]   CSN: [id number]        ENCOUNTER  Patient Class: OU

## (undated) NOTE — IP AVS SNAPSHOT
[address]  602 [facility] [phone]                Discharge Summary   3/21/2017    John Blair           Admission Information        Provider Department    3/21/2017 Yessica France MD [facility] Shoyolanda Office Visit with [address] [place] [address],First Floor Maternal Fetal Medicine ([address])    [facility]  [address] [address]   [phone]            May 12, 2017 11:00 AM   Office Visit with [address] [place] [address],First Floor Mat Radiology Exams     None         Additional Information       We are concerned for your overall well being:    - If you are a smoker or have smoked in the last 12 months, we encourage you to explore options for quitting.     - If you have concerns related experience these side effects or respond to medications the same. Please call your provider or healthcare team if you have any questions regarding your medications while at home.          GI Medications     Lactobacillus Rhamnosus, GG, ([employer], LACTO

## (undated) NOTE — IP AVS SNAPSHOT
[address]  602 [facility] [phone]                Discharge Summary   5/22/2017    Arron Whiteside           Admission Information        Provider Department    5/22/2017 Sara Perez MD [facility] Office Visit with [address] [facility],First Floor Maternal Fetal Medicine ([address])    [facility]  [address] [place] [address]   [phone]            Jun 29, 2017 10:30 AM   Office Visit with [address] [facility],First Floor Mat Recent Hematology Lab Results (cont.)  (Last 3 results in the past 90 days)    Neutrophil % Lymphocyte % Monocyte % Eosinophil % Basophil % Prelim Neut Abs Final Neut Abs Lymphocyte Abso Monocyte Absolu Eosinophil Abso Basophil Absolu    (04/27/17)  74 (04 Call [phone] for help. [employer] is NOT to be used for urgent needs.   For medical emergencies, dial 911.             _____________________________________________________________________________    Medication Side Effects - Medications to be taken at

## (undated) NOTE — Clinical Note
Weekly NST at 32wks Monthly growth US Fetal echocardiogram at 24wks TTTS checks every 2 wks with Dopplers MCA dopplers starting at 26 wks Plan delivery at 36-37 weeks for monochorionic placenta. Earlier delivery if indication arises.

## (undated) NOTE — Clinical Note
Weekly NST at 32wks Level 2 US at 20wks Monthly growth US Fetal echocardiogram at 24wks Cervical length in 2wks TTTS checks every 2 wks with dopplers MCA dopplers starting at 26wks

## (undated) NOTE — MR AVS SNAPSHOT
[facility]  [address] [place] [id number] [phone]               Thank you for choosing us for your health care visit with Jenny Reveles.  Vero Bar MD.  We are glad to serve you and happy to provide you with this summary of your vis Today's Vital Signs     BP                   104/60 mmHg              Current Medications          This list is accurate as of: 3/10/17 11:59 PM.  Always use your most recent med list.                Cod Liver Oil 1000 MG Caps   Take by mouth.            CV